# Patient Record
Sex: FEMALE | Race: WHITE | HISPANIC OR LATINO | URBAN - METROPOLITAN AREA
[De-identification: names, ages, dates, MRNs, and addresses within clinical notes are randomized per-mention and may not be internally consistent; named-entity substitution may affect disease eponyms.]

---

## 2022-10-12 ENCOUNTER — TELEPHONE (OUTPATIENT)
Dept: OBSTETRICS AND GYNECOLOGY | Facility: CLINIC | Age: 27
End: 2022-10-12

## 2022-10-12 NOTE — TELEPHONE ENCOUNTER
I added this pt to your schedule for Monday. She is 29 weeks with basically no [facility] care[facility] had only had an u/s in [place] and that's the only thing she has had done so far. Do you want me to add an U/S to this visit also?

## 2022-10-17 ENCOUNTER — INITIAL PRENATAL (OUTPATIENT)
Dept: OBSTETRICS AND GYNECOLOGY | Facility: CLINIC | Age: 27
End: 2022-10-17

## 2022-10-17 VITALS
HEIGHT: 65 IN | BODY MASS INDEX: 24.66 KG/M2 | WEIGHT: 148 LBS | SYSTOLIC BLOOD PRESSURE: 118 MMHG | DIASTOLIC BLOOD PRESSURE: 74 MMHG

## 2022-10-17 DIAGNOSIS — Z3A.29 29 WEEKS GESTATION OF PREGNANCY: Primary | ICD-10-CM

## 2022-10-17 DIAGNOSIS — Z78.9 USES SPANISH AS PRIMARY SPOKEN LANGUAGE: ICD-10-CM

## 2022-10-17 DIAGNOSIS — Z36.9 ENCOUNTER FOR ANTENATAL SCREENING, UNSPECIFIED: ICD-10-CM

## 2022-10-17 LAB
GLUCOSE UR STRIP-MCNC: NEGATIVE MG/DL
PROT UR STRIP-MCNC: NEGATIVE MG/DL

## 2022-10-17 PROCEDURE — 99204 OFFICE O/P NEW MOD 45 MIN: CPT | Performed by: STUDENT IN AN ORGANIZED HEALTH CARE EDUCATION/TRAINING PROGRAM

## 2022-10-17 PROCEDURE — 90686 IIV4 VACC NO PRSV 0.5 ML IM: CPT | Performed by: STUDENT IN AN ORGANIZED HEALTH CARE EDUCATION/TRAINING PROGRAM

## 2022-10-17 PROCEDURE — 90471 IMMUNIZATION ADMIN: CPT | Performed by: STUDENT IN AN ORGANIZED HEALTH CARE EDUCATION/TRAINING PROGRAM

## 2022-10-17 NOTE — PROGRESS NOTES
27 y.o.     29w0d  weeks presents for a Transfer of care OB appointment for a desired pregnancy.  She denies bleeding, cramping and abnormal vaginal discharge. This is a desired pregnancy    EDC:             by [  X ] LMP  [   ] US       G  2     P   0010           1. Chart review +CT ; denies in interview     2. SANCHO 29wga   OB labs today   NIPT  GC/CT today   Pap screen         3.  Kinyarwanda primary language             4.  34wga   -Not on progesterone            5.                  6.                 7.                   8.               9.                    10.                      Meds:  No current outpatient medications on file.           PN Labs Pending  [   ] Normal   [   ] Rh positive  [   ] Rh negative   [   ] RhoGam 28 wks             Ultrasounds:            2nd US:  date:     EGA:      [   ] CWD    Placenta:      Anatomy:              Postpartum contraception:                Infant feeding plan: Breastfeeding \Bottle                                 Feeling fine.   Periods are regular.        Active Ambulatory Problems     Diagnosis Date Noted   • Uses Kinyarwanda as primary spoken language 10/17/2022   • 29 weeks gestation of pregnancy 10/17/2022     Resolved Ambulatory Problems     Diagnosis Date Noted   • No Resolved Ambulatory Problems     No Additional Past Medical History                 No past surgical history on file.        POB hx:   PGYN hx:    Social hx:   Social History     Socioeconomic History   • Marital status:         [  X ] no h/o abuse/DV:         [ X  ] No ETOH, tobacco, drugs  [   ] Tobacco use   [   ] Alcohol use  [   ] Drug use   Marital status:           [  X ] FOB involved     Significant family hx: No family history on file.                 [ X  ] No birth defects, stillbirth           No Known Allergies              Meds: See above       ROS     PE NOB   Vitals: VSS; AF    General Appearance:  Awake. Alert. Well developed. Well nourished. In no acute  distress.      Lungs:  ° Clear to auscultation bilaterally without wheezes, rales or rhonchi.  Cardiovascular:  ° System: RRR, no murmur, consistent with normal pregnancy.  Abdomen:  Visual Inspection: ° Abdomen was normal on visual inspection.  Palpation: ° Abdomen was soft. ° Abdominal non-tender.  Genitalia:  External: ° Vulva was normal. ° Labia showed no abnormalities. ° Clitoris was normal. ° [place]'s gland was normal. ° Bartholin's gland was normal. ° Genitalia exhibited no lesion.  Vagina: ° Mucosa was normal. ° Not tender. ° No vaginal mass was observed. Vericositiy R sided   Cervix: ° No cervical discharge. ° Not tender.  Uterus: ° Fundal height was normal for gestational age. ° Not tender.  Uterine Adnexae: ° Normal without masses or tenderness.  Neurological:  ° Oriented to time, place, and person.  Skin:  ° General appearance was normal. No bruising or ecchymosis.  Obstetrical:  + FM and FCA         A/P     27 y.o.     29w0d  weeks presents for a OB appointment for a desired pregnancy.  She denies bleeding, cramping and abnormal vaginal discharge. Today     1. Chart review +CT ; denies in interview   SANCHO today     2. SANCHO 29wga   OB labs today   NIPT  GC/CT today   Pap screen         3.  Uzbek primary language             4.  34wga   -Not on progesterone @ 29wga        New OB exam completed, pap was collected   Prenatal labs were collected  Screening for CF, SMA and FX  and expanded carrier screening was discussed and pt was interested in having that lab done today.   Screening for cell free DNA was also discussed and the patient was interested. Blood was drawn today for this test. Unable to offer AFP   Patient is taking Prenatal vitamins  Problem list reviewed and updated  Reviewed routine prenatal care with the office to include but not limited to expected weight gain during pregnancy, Tylenol products are fine, avoid aspirin and ibuprofen; Zika (travel restrictions/ok to use insect  repellant); COVID precautions, not to change cat litter; food restrictions; avoidance of alcohol, tobacco, drugs and saunas/hot tubs.   We also discussed the structure of the practice and recommend that patients see all providers as we do not know who will do her delivery. The timing of appointments and the after hours call line use were also reviewed.   Flu vaccine today   Tdap next appt ( We were out today; pt desired)   All questions answered.   F/u in 2 week for OB appt         Tee Blum DO  10/17/2022    13:57 EDT

## 2022-10-18 LAB
ABO GROUP BLD: ABNORMAL
BASOPHILS # BLD AUTO: 0 X10E3/UL (ref 0–0.2)
BASOPHILS NFR BLD AUTO: 1 %
BLD GP AB SCN SERPL QL: NEGATIVE
EOSINOPHIL # BLD AUTO: 0.1 X10E3/UL (ref 0–0.4)
EOSINOPHIL NFR BLD AUTO: 1 %
ERYTHROCYTE [DISTWIDTH] IN BLOOD BY AUTOMATED COUNT: 12.3 % (ref 11.7–15.4)
HBA1C MFR BLD: 4.9 % (ref 4.8–5.6)
HBV SURFACE AG SERPL QL IA: NEGATIVE
HCT VFR BLD AUTO: 33.9 % (ref 34–46.6)
HCV AB S/CO SERPL IA: <0.1 S/CO RATIO (ref 0–0.9)
HGB A MFR BLD ELPH: 97.4 % (ref 96.4–98.8)
HGB A2 MFR BLD ELPH: 2.6 % (ref 1.8–3.2)
HGB BLD-MCNC: 11.5 G/DL (ref 11.1–15.9)
HGB F MFR BLD ELPH: 0 % (ref 0–2)
HGB FRACT BLD-IMP: NORMAL
HGB S MFR BLD ELPH: 0 %
HIV 1+2 AB+HIV1 P24 AG SERPL QL IA: NON REACTIVE
IMM GRANULOCYTES # BLD AUTO: 0 X10E3/UL (ref 0–0.1)
IMM GRANULOCYTES NFR BLD AUTO: 1 %
LYMPHOCYTES # BLD AUTO: 1.4 X10E3/UL (ref 0.7–3.1)
LYMPHOCYTES NFR BLD AUTO: 20 %
MCH RBC QN AUTO: 30.9 PG (ref 26.6–33)
MCHC RBC AUTO-ENTMCNC: 33.9 G/DL (ref 31.5–35.7)
MCV RBC AUTO: 91 FL (ref 79–97)
MONOCYTES # BLD AUTO: 0.6 X10E3/UL (ref 0.1–0.9)
MONOCYTES NFR BLD AUTO: 8 %
NEUTROPHILS # BLD AUTO: 4.9 X10E3/UL (ref 1.4–7)
NEUTROPHILS NFR BLD AUTO: 69 %
PLATELET # BLD AUTO: 209 X10E3/UL (ref 150–450)
RBC # BLD AUTO: 3.72 X10E6/UL (ref 3.77–5.28)
RH BLD: POSITIVE
RPR SER QL: NON REACTIVE
RUBV IGG SERPL IA-ACNC: 1.27 INDEX
VZV IGG SER IA-ACNC: 498 INDEX
WBC # BLD AUTO: 7.1 X10E3/UL (ref 3.4–10.8)

## 2022-10-19 LAB
AMPHETAMINES UR QL SCN: NEGATIVE NG/ML
BACTERIA UR CULT: NO GROWTH
BACTERIA UR CULT: NORMAL
BARBITURATES UR QL SCN: NEGATIVE NG/ML
BENZODIAZ UR QL SCN: NEGATIVE NG/ML
BZE UR QL SCN: NEGATIVE NG/ML
CANNABINOIDS UR QL SCN: NEGATIVE NG/ML
CREAT UR-MCNC: 27.2 MG/DL (ref 20–300)
LABORATORY COMMENT REPORT: NORMAL
METHADONE UR QL SCN: NEGATIVE NG/ML
OPIATES UR QL SCN: NEGATIVE NG/ML
OXYCODONE+OXYMORPHONE UR QL SCN: NEGATIVE NG/ML
PCP UR QL: NEGATIVE NG/ML
PH UR: 7.3 [PH] (ref 4.5–8.9)
PROPOXYPH UR QL SCN: NEGATIVE NG/ML

## 2022-10-24 LAB
CYSTIC FIBROSIS MUTATION 97: NORMAL
GENE DIS ANL CARRIER INTERP-IMP: NORMAL

## 2022-10-25 LAB
C TRACH RRNA CVX QL NAA+PROBE: NEGATIVE
CONV .: ABNORMAL
CYTOLOGIST CVX/VAG CYTO: ABNORMAL
CYTOLOGY CVX/VAG DOC CYTO: ABNORMAL
CYTOLOGY CVX/VAG DOC THIN PREP: ABNORMAL
DX ICD CODE: ABNORMAL
DX ICD CODE: ABNORMAL
HIV 1 & 2 AB SER-IMP: ABNORMAL
HPV I/H RISK 4 DNA CVX QL PROBE+SIG AMP: POSITIVE
N GONORRHOEA RRNA CVX QL NAA+PROBE: NEGATIVE
OTHER STN SPEC: ABNORMAL
PATHOLOGIST CVX/VAG CYTO: ABNORMAL
RECOM F/U CVX/VAG CYTO: ABNORMAL
STAT OF ADQ CVX/VAG CYTO-IMP: ABNORMAL
T VAGINALIS RRNA SPEC QL NAA+PROBE: NEGATIVE

## 2022-10-27 LAB
CLINICAL GENETICS COUNSELING NOTE: NORMAL
CLINICAL INFO: NORMAL
ETHNIC BACKGROUND STATED: NORMAL
FMR1 GENE CGG RPT BLD/T QL: NORMAL
LAB DIRECTOR NAME PROVIDER: NORMAL
LABORATORY COMMENT REPORT: NORMAL
LABORATORY COMMENT REPORT: NORMAL
REASON FOR REFERRAL (NARRATIVE): NORMAL
REF LAB TEST METHOD: NORMAL
SMN1 GENE MUT ANL BLD/T: NORMAL
SPECIMEN SOURCE: NORMAL
TEST PERFORMANCE INFO SPEC: NORMAL
TEST PERFORMANCE INFO SPEC: NORMAL

## 2022-11-01 ENCOUNTER — ROUTINE PRENATAL (OUTPATIENT)
Dept: OBSTETRICS AND GYNECOLOGY | Facility: CLINIC | Age: 27
End: 2022-11-01

## 2022-11-01 VITALS — BODY MASS INDEX: 23.96 KG/M2 | SYSTOLIC BLOOD PRESSURE: 102 MMHG | WEIGHT: 144 LBS | DIASTOLIC BLOOD PRESSURE: 62 MMHG

## 2022-11-01 DIAGNOSIS — O09.33 INSUFFICIENT PRENATAL CARE IN THIRD TRIMESTER: ICD-10-CM

## 2022-11-01 DIAGNOSIS — Z87.51 HISTORY OF PRETERM DELIVERY: ICD-10-CM

## 2022-11-01 DIAGNOSIS — Z34.93 PRENATAL CARE IN THIRD TRIMESTER: Primary | ICD-10-CM

## 2022-11-01 DIAGNOSIS — Z78.9 USES SPANISH AS PRIMARY SPOKEN LANGUAGE: ICD-10-CM

## 2022-11-01 PROBLEM — O98.819 CHLAMYDIA INFECTION DURING PREGNANCY: Status: ACTIVE | Noted: 2022-11-01

## 2022-11-01 PROBLEM — A74.9 CHLAMYDIA INFECTION DURING PREGNANCY: Status: ACTIVE | Noted: 2022-11-01

## 2022-11-01 LAB
GLUCOSE UR STRIP-MCNC: NEGATIVE MG/DL
PROT UR STRIP-MCNC: NEGATIVE MG/DL

## 2022-11-01 PROCEDURE — 99214 OFFICE O/P EST MOD 30 MIN: CPT | Performed by: NURSE PRACTITIONER

## 2022-11-01 RX ORDER — PRENATAL VIT/IRON FUM/FOLIC AC 27MG-0.8MG
TABLET ORAL DAILY
COMMUNITY

## 2022-11-01 RX ORDER — DOXYCYCLINE HYCLATE 50 MG/1
324 CAPSULE, GELATIN COATED ORAL
COMMUNITY
End: 2022-11-16

## 2022-11-01 NOTE — PROGRESS NOTES
OB follow up > 20 weeks    Chief Complaint   Patient presents with   • Routine Prenatal Visit       Beatris Butler is a 27 y.o.  31w1d being seen today for her obstetrical visit.  Patient reports no complaints. Taking prenatal vitamins: Yes. She has had scant prenatal care. Her EDC 23 was established with a physician in [place]. She is uncertain if she had an anatomy US. She thinks she is having a boy. States she had an US approx 2 months ago but is unable to recall location of the ultrasound.        Review of Systems  Genitourinary: Negative for contractions, cramping, vaginal bleeding, or SROM.   Fetal movement: normal  No Known Allergies     /62   Wt 65.3 kg (144 lb)   LMP 2022   BMI 23.96 kg/m²     FHT: 130s  BPM   Uterine Size: size equals dates       Assessment    1) pregnancy at 31w1d- Needs anatomy US.      2) H/O  delivery-  @ 34 weeks. No progesterone therapy.     3) S/P Flu vaccine    4) S/P covid vaccine and booster     5) Tdap vaccine- Disc that all pregnant women should get a Tdap shot in the third trimester, preferably between 27 weeks and 36 weeks of pregnancy. The Tdap shot is an effective and safe way to protect the baby from serious illness and complications of pertussis. Recommend that partners, family members, and infant caregivers should be up to date on theTdap vaccine if they have not previously been vaccinated. Ideally, all family members should be vaccinated at least 2 weeks before coming in contact with the . If not administered during pregnancy, the Tdap vaccine should be given immediately postpartum if the patient is not UTD on Tdap.  She is considering the vaccine.     6) Maltese speaking:  used for visit.     7) CF, SMA and FX neg. Desires T21 but lab was not drawn. Check NIPS.     8) Needs 2hr GTT     9) ASCUS/HPV+     10) + Chlamydia- SANCHO neg 10/22.     Plan    Continue prenatal vitamins  Reviewed this stage of  pregnancy  Problem list updated   RTO on Friday for US, 2hr GTT and [id number] then RTO 2 week     I spent 30 minutes caring for Beatris on this date of service. This time includes time spent by me in the following activities: preparing for the visit, reviewing tests, obtaining and/or reviewing a separately obtained history, performing a medically appropriate examination and/or evaluation, counseling and educating the patient/family/caregiver, ordering medications, tests, or procedures, documenting information in the medical record and care coordination      Ruth Grant, HENRIQUE  11/1/2022  11:50 EDT

## 2022-11-04 ENCOUNTER — TELEPHONE (OUTPATIENT)
Dept: OBSTETRICS AND GYNECOLOGY | Facility: CLINIC | Age: 27
End: 2022-11-04

## 2022-11-14 ENCOUNTER — TELEPHONE (OUTPATIENT)
Dept: OBSTETRICS AND GYNECOLOGY | Facility: CLINIC | Age: 27
End: 2022-11-14

## 2022-11-15 ENCOUNTER — ROUTINE PRENATAL (OUTPATIENT)
Dept: OBSTETRICS AND GYNECOLOGY | Facility: CLINIC | Age: 27
End: 2022-11-15

## 2022-11-15 VITALS — WEIGHT: 148 LBS | SYSTOLIC BLOOD PRESSURE: 102 MMHG | DIASTOLIC BLOOD PRESSURE: 62 MMHG | BODY MASS INDEX: 24.63 KG/M2

## 2022-11-15 DIAGNOSIS — Z78.9 USES SPANISH AS PRIMARY SPOKEN LANGUAGE: ICD-10-CM

## 2022-11-15 DIAGNOSIS — O98.819 CHLAMYDIA INFECTION DURING PREGNANCY: Primary | ICD-10-CM

## 2022-11-15 DIAGNOSIS — A74.9 CHLAMYDIA INFECTION DURING PREGNANCY: Primary | ICD-10-CM

## 2022-11-15 DIAGNOSIS — Z3A.33 33 WEEKS GESTATION OF PREGNANCY: ICD-10-CM

## 2022-11-15 DIAGNOSIS — Z87.51 HISTORY OF PRETERM DELIVERY: ICD-10-CM

## 2022-11-15 DIAGNOSIS — R87.810 ASCUS WITH POSITIVE HIGH RISK HPV CERVICAL: ICD-10-CM

## 2022-11-15 DIAGNOSIS — R87.610 ASCUS WITH POSITIVE HIGH RISK HPV CERVICAL: ICD-10-CM

## 2022-11-15 PROBLEM — Z3A.29 29 WEEKS GESTATION OF PREGNANCY: Status: RESOLVED | Noted: 2022-10-17 | Resolved: 2022-11-15

## 2022-11-15 LAB
GLUCOSE UR STRIP-MCNC: NEGATIVE MG/DL
PROT UR STRIP-MCNC: NEGATIVE MG/DL

## 2022-11-15 PROCEDURE — 90715 TDAP VACCINE 7 YRS/> IM: CPT | Performed by: STUDENT IN AN ORGANIZED HEALTH CARE EDUCATION/TRAINING PROGRAM

## 2022-11-15 PROCEDURE — 99214 OFFICE O/P EST MOD 30 MIN: CPT | Performed by: STUDENT IN AN ORGANIZED HEALTH CARE EDUCATION/TRAINING PROGRAM

## 2022-11-15 PROCEDURE — 90471 IMMUNIZATION ADMIN: CPT | Performed by: STUDENT IN AN ORGANIZED HEALTH CARE EDUCATION/TRAINING PROGRAM

## 2022-11-15 NOTE — PROGRESS NOTES
OB follow up > 20 weeks    Chief Complaint   Patient presents with   • Routine Prenatal Visit       Beatris Butler is a 27 y.o.  33+ being seen today for her obstetrical visit.  Patient reports no complaints. Taking prenatal vitamins: Desires TDap today       Review of Systems  Genitourinary: Negative for contractions, cramping, vaginal bleeding, or SROM.   Fetal movement: normal  No Known Allergies     /62   Wt 67.1 kg (148 lb)   LMP 2022   BMI 24.63 kg/m²   Vitals: VSS; AF    General Appearance:  Awake. Alert. Well developed. Well nourished. In no acute distress.    Visual Inspection: ° Abdomen was normal on visual inspection.  Palpation: ° Abdomen was soft. ° Abdominal non-tender.    Uterus: ° Fundal height was normal for gestational age. ° Not tender.  Uterine Adnexae: ° Normal without masses or tenderness.  Neurological:  ° Oriented to time, place, and person.  Skin:  ° General appearance was normal. No bruising or ecchymosis.  Obstetrical: + FCA and FM     Assessment    1) pregnancy at 33+  2hr WNL  Mild anemia    2) H/O  delivery-  @ 34 weeks. No progesterone therapy.     3) S/P Flu vaccine    4) S/P covid vaccine and booster     5) Tdap vaccine today    6) Tunisian speaking:  used for visit.     7) CF, SMA and FX neg. Desires T21 but lab was not drawn. Check NIPS today.     8)  ASCUS/HPV+   Colpo 6 weeks PP      9) + Chlamydia- SANCHO neg 10/22.       Plan    Continue prenatal vitamins  Reviewed this stage of pregnancy  Problem list updated   OB appt 36wga ; GBS and presentation US     I spent 30 minutes caring for Beatris on this date of service. This time includes time spent by me in the following activities: preparing for the visit, reviewing tests, obtaining and/or reviewing a separately obtained history, performing a medically appropriate examination and/or evaluation, counseling and educating the patient/family/caregiver, ordering medications, tests, or  procedures, documenting information in the medical record, independently interpreting results and communicating that information with the patient/family/caregiver and care coordination     Tee Blum DO  11/15/2022  09:32 EST

## 2022-11-16 RX ORDER — DOXYCYCLINE HYCLATE 50 MG/1
324 CAPSULE, GELATIN COATED ORAL 2 TIMES DAILY
Qty: 60 TABLET | Refills: 2 | Status: SHIPPED | OUTPATIENT
Start: 2022-11-16 | End: 2023-01-06 | Stop reason: HOSPADM

## 2022-11-17 PROBLEM — O09.33 INSUFFICIENT PRENATAL CARE IN THIRD TRIMESTER: Status: ACTIVE | Noted: 2022-11-17

## 2022-11-17 PROBLEM — Z34.93 PRENATAL CARE IN THIRD TRIMESTER: Status: ACTIVE | Noted: 2022-11-17

## 2022-12-05 ENCOUNTER — PATIENT OUTREACH (OUTPATIENT)
Dept: LABOR AND DELIVERY | Facility: HOSPITAL | Age: 27
End: 2022-12-05

## 2022-12-05 ENCOUNTER — REFERRAL TRIAGE (OUTPATIENT)
Dept: LABOR AND DELIVERY | Facility: HOSPITAL | Age: 27
End: 2022-12-05

## 2022-12-05 NOTE — OUTREACH NOTE
[facility]  Unable to Reach       Questions/Answers    Flowsheet Row Responses   Pending Outreach Confirm Patient Interest   Call Attempt First   Outcome Not available   Next Call Attempt Date 12/09/22              Maurice Yost, RN  Maternity Nurse Navigator    12/5/2022, 14:36 EST

## 2022-12-06 ENCOUNTER — ROUTINE PRENATAL (OUTPATIENT)
Dept: OBSTETRICS AND GYNECOLOGY | Facility: CLINIC | Age: 27
End: 2022-12-06

## 2022-12-06 VITALS — DIASTOLIC BLOOD PRESSURE: 72 MMHG | BODY MASS INDEX: 25.46 KG/M2 | SYSTOLIC BLOOD PRESSURE: 122 MMHG | WEIGHT: 153 LBS

## 2022-12-06 DIAGNOSIS — Z3A.36 36 WEEKS GESTATION OF PREGNANCY: ICD-10-CM

## 2022-12-06 DIAGNOSIS — Z36.85 ENCOUNTER FOR ANTENATAL SCREENING FOR STREPTOCOCCUS B: Primary | ICD-10-CM

## 2022-12-06 DIAGNOSIS — O09.33 INSUFFICIENT PRENATAL CARE IN THIRD TRIMESTER: ICD-10-CM

## 2022-12-06 DIAGNOSIS — Z87.51 HISTORY OF PRETERM DELIVERY: ICD-10-CM

## 2022-12-06 DIAGNOSIS — Z78.9 USES SPANISH AS PRIMARY SPOKEN LANGUAGE: ICD-10-CM

## 2022-12-06 PROBLEM — Z3A.33 33 WEEKS GESTATION OF PREGNANCY: Status: RESOLVED | Noted: 2022-11-15 | Resolved: 2022-12-06

## 2022-12-06 PROBLEM — Z34.93 PRENATAL CARE IN THIRD TRIMESTER: Status: RESOLVED | Noted: 2022-11-17 | Resolved: 2022-12-06

## 2022-12-06 PROBLEM — Z34.90 PREGNANCY: Status: ACTIVE | Noted: 2022-12-06

## 2022-12-06 LAB
GLUCOSE UR STRIP-MCNC: NEGATIVE MG/DL
PROT UR STRIP-MCNC: NEGATIVE MG/DL

## 2022-12-06 PROCEDURE — 99214 OFFICE O/P EST MOD 30 MIN: CPT | Performed by: OBSTETRICS & GYNECOLOGY

## 2022-12-06 NOTE — PROGRESS NOTES
CC: Pt here for ob office visit and ob internal/GBS.    HPI: Beatris Butler is a 27 y.o.  36w1d being seen today for her obstetrical visit.   Patient reports backache .   Fetal movement: normal. .        ROS: Pt denies visual changes, headaches, shortness of breath, chest pain, esophageal reflux, gastric pain,   nausea and vomiting, diarrhea, rashes, vaginal bleeding, edema, hip pain, pelvic pressure.     SMOKER? No    ALCOHOL? No  ILLICIT DRUGS? No    O:  /72   Wt 69.4 kg (153 lb)   LMP 2022   BMI 25.46 kg/m² , additional findings in addition to above flow sheet?: all recent labs reviewed with pt.     Data Review:  UA, flow sheet,  TESTING: u/s: no    Lab Results (last 24 hours)     Procedure Component Value Units Date/Time    POC Urinalysis Dipstick [[id number]] Resulted: 22    Specimen: Urine Updated: 22 0910     Glucose, UA Negative     Protein, POC Negative          I saw the patient with a face mask, gloves and eye protection  The patient herself was masked.  Social distancing was observed as appropriate. All COVID precautions observed.     A:    DIAGNOSES:  27 y.o.  36w1d  Patient Active Problem List   Diagnosis   • Uses Citizen of [place] as primary spoken language   • Chlamydia infection during pregnancy: SANCHO check 22    • History of  delivery @ 34 weeks in [place]    • ASCUS with positive high risk HPV cervical   • Insufficient prenatal care in third trimester   • Pregnancy     Diagnoses and all orders for this visit:    1. Encounter for  screening for Streptococcus B (Primary)  -     Strep B Screen - Swab, Vaginal/Rectum    2. History of  delivery @ 34 weeks in [place]     3. Insufficient prenatal care in third trimester    4. Uses Citizen of [place] as primary spoken language    5. 36 weeks gestation of pregnancy      Pregnancy Assessment : Active Problem with Pregnancy   NEW PROBLEMS? none    P:  Tests ordered for this or next visit: LABS:  ua, GBS  New Meds:No  Return in about 1 year (around 12/6/2023) for Annual physical.      Pt instructed to call for results of any testing done today and that failure to call if she has not heard from us could result in inadequate care and treament.  Pt verbalized her understanding.     I spent 30+ minutes caring for Beatris on this date of service. This time includes time spent by me in the following activities: preparing for the visit, reviewing tests, obtaining and/or reviewing a separately obtained history, performing a medically appropriate examination and/or evaluation, counseling and educating the patient/family/caregiver, ordering medications, tests, or procedures, referring and communicating with other health care professionals, documenting information in the medical record, independently interpreting results and communicating that information with the patient/family/caregiver and care coordination  Labor warnings reviewed with pt in Yoruba    Vinod Benitez MD

## 2022-12-08 LAB — GP B STREP DNA SPEC QL NAA+PROBE: NEGATIVE

## 2022-12-13 ENCOUNTER — ROUTINE PRENATAL (OUTPATIENT)
Dept: OBSTETRICS AND GYNECOLOGY | Facility: CLINIC | Age: 27
End: 2022-12-13

## 2022-12-13 ENCOUNTER — PATIENT OUTREACH (OUTPATIENT)
Dept: LABOR AND DELIVERY | Facility: HOSPITAL | Age: 27
End: 2022-12-13

## 2022-12-13 VITALS — WEIGHT: 151 LBS | SYSTOLIC BLOOD PRESSURE: 112 MMHG | BODY MASS INDEX: 25.13 KG/M2 | DIASTOLIC BLOOD PRESSURE: 74 MMHG

## 2022-12-13 DIAGNOSIS — Z87.51 HISTORY OF PRETERM DELIVERY: ICD-10-CM

## 2022-12-13 DIAGNOSIS — Z34.93 NORMAL PREGNANCY IN THIRD TRIMESTER: Primary | ICD-10-CM

## 2022-12-13 LAB
GLUCOSE UR STRIP-MCNC: NEGATIVE MG/DL
PROT UR STRIP-MCNC: NEGATIVE MG/DL

## 2022-12-13 PROCEDURE — 99213 OFFICE O/P EST LOW 20 MIN: CPT | Performed by: OBSTETRICS & GYNECOLOGY

## 2022-12-13 NOTE — OUTREACH NOTE
[facility]  Unable to Reach       Questions/Answers    Flowsheet Row Responses   Pending Outreach Confirm Patient Interest   Call Attempt Second   Outcome Not available   Next Call Attempt Date 12/27/22          Office appt for 12/20 1145. Have attempted x2 to call and VM not set up.    Maurice Yost, RN  Maternity Nurse Navigator    12/13/2022, 15:05 EST

## 2022-12-13 NOTE — PROGRESS NOTES
OB follow up     Beatris Butler is a 27 y.o.  37w1d being seen today for her obstetrical visit.  Patient reports no bleeding, no leaking and occasional contractions. Fetal movement: normal GBS was negative last visit.     Review of Systems  No bleeding, No cramping/contractions     /74   Wt 68.5 kg (151 lb)   LMP 2022   BMI 25.13 kg/m²     FHT: present BPM   Uterine Size: 37 cm   GBS discussed with patient    Assessment/Plan:    1) 27 y.o.  -pregnancy at 37w1d    2)   Encounter Diagnoses   Name Primary?   • Normal pregnancy in third trimester Yes   • History of  delivery @ 34 weeks in [place]     Test results reviewed with patient, labor warnings reviewed.   was used throughout this visit. I spent 20 minutes caring for Beatris on this date of service. This time includes time spent by me in the following activities: preparing for the visit, reviewing tests, performing a medically appropriate examination and/or evaluation, counseling and educating the patient/family/caregiver and documenting information in the medical record.      3) Reviewed this stage of pregnancy  4) Problem list updated     Return in about 1 week (around 2022) for OB INT.      Silverio Villalobos MD    2022  13:48 EST

## 2022-12-20 ENCOUNTER — ROUTINE PRENATAL (OUTPATIENT)
Dept: OBSTETRICS AND GYNECOLOGY | Facility: CLINIC | Age: 27
End: 2022-12-20

## 2022-12-20 VITALS — BODY MASS INDEX: 24.79 KG/M2 | WEIGHT: 149 LBS | SYSTOLIC BLOOD PRESSURE: 112 MMHG | DIASTOLIC BLOOD PRESSURE: 70 MMHG

## 2022-12-20 DIAGNOSIS — Z34.93 PRENATAL CARE IN THIRD TRIMESTER: Primary | ICD-10-CM

## 2022-12-20 DIAGNOSIS — O98.819 CHLAMYDIA INFECTION DURING PREGNANCY: ICD-10-CM

## 2022-12-20 DIAGNOSIS — A74.9 CHLAMYDIA INFECTION DURING PREGNANCY: ICD-10-CM

## 2022-12-20 LAB
GLUCOSE UR STRIP-MCNC: NEGATIVE MG/DL
PROT UR STRIP-MCNC: NEGATIVE MG/DL

## 2022-12-20 PROCEDURE — 99213 OFFICE O/P EST LOW 20 MIN: CPT | Performed by: NURSE PRACTITIONER

## 2022-12-20 NOTE — PROGRESS NOTES
OB follow up > 20 weeks    Chief Complaint   Patient presents with   • Routine Prenatal Visit       Beatris Butler is a 27 y.o.  38w1d being seen today for her obstetrical visit.  Patient reports irregular contractions. She reports good fetal movement. .         Review of Systems  Genitourinary: Negative for  vaginal bleeding, or SROM. + occas contractions   Fetal movement: normal  No Known Allergies     /70   Wt 67.6 kg (149 lb)   LMP 2022   BMI 24.79 kg/m²     FHT: 130s   BPM   Uterine Size: 38 cm     SVE:  Cervix posterior, exam very difficult, pt has a low set pubic bone.     Assessment    1) pregnancy at 38w1d- GBS negative.      2) H/O  delivery-  @ 34 weeks. No progesterone therapy.     3) S/P Flu vaccine    4) S/P covid vaccine and booster     5) S/P tDap vaccine     6) Angolan speaking:  used for visit.     7) CF, SMA and FX neg. NIPS neg.     8)  ASCUS/HPV+     9) + Chlamydia- SANCHO neg 10/22. Check G/C/T today.     Plan    Continue prenatal vitamins  Reviewed this stage of pregnancy  Problem list updated   RTO 1 week    I spent 20 minutes caring for Beatris on this date of service. This time includes time spent by me in the following activities: preparing for the visit, reviewing tests, obtaining and/or reviewing a separately obtained history, performing a medically appropriate examination and/or evaluation, counseling and educating the patient/family/caregiver, ordering medications, tests, or procedures, documenting information in the medical record and care coordination      Ruth Grant, HENRIQUE  2022  12:10 EST

## 2022-12-22 LAB
C TRACH RRNA SPEC QL NAA+PROBE: NEGATIVE
N GONORRHOEA RRNA SPEC QL NAA+PROBE: NEGATIVE
T VAGINALIS RRNA SPEC QL NAA+PROBE: NEGATIVE

## 2022-12-28 ENCOUNTER — ROUTINE PRENATAL (OUTPATIENT)
Dept: OBSTETRICS AND GYNECOLOGY | Facility: CLINIC | Age: 27
End: 2022-12-28
Payer: MEDICAID

## 2022-12-28 VITALS — DIASTOLIC BLOOD PRESSURE: 74 MMHG | BODY MASS INDEX: 25.53 KG/M2 | SYSTOLIC BLOOD PRESSURE: 102 MMHG | WEIGHT: 153.4 LBS

## 2022-12-28 DIAGNOSIS — Z3A.39 39 WEEKS GESTATION OF PREGNANCY: Primary | ICD-10-CM

## 2022-12-28 LAB
GLUCOSE UR STRIP-MCNC: NEGATIVE MG/DL
PROT UR STRIP-MCNC: NEGATIVE MG/DL

## 2022-12-28 PROCEDURE — 99213 OFFICE O/P EST LOW 20 MIN: CPT | Performed by: NURSE PRACTITIONER

## 2022-12-28 NOTE — PROGRESS NOTES
OB follow up > 20 weeks     Chief Complaint   Patient presents with   • Routine Prenatal Visit       Beatris Butler is a 27 y.o.  39w2d being seen today for her obstetrical visit.  She reports good fetal movement. Patient reports irregular contractions. Described as mild.       Review of Systems  Genitourinary: Negative for  vaginal bleeding, or SROM. + occas contractions   Fetal movement: normal  No Known Allergies     /74   Wt 69.6 kg (153 lb 6.4 oz)   LMP 2022   BMI 25.53 kg/m²     FHT: 130s  BPM   Uterine Size: 38 cm     SVE:  /-3.     Assessment    1) pregnancy at 39wd- GBS negative.      2) H/O  delivery-  @ 34 weeks. No progesterone therapy.     3) S/P Flu vaccine    4) S/P covid vaccine and booster     5) S/P tDap vaccine     6) Chinese speaking:  used for visit.     7) CF, SMA and FX neg. NIPS neg.     8)  ASCUS/HPV+     9) + Chlamydia- SANCHO neg 10/22 and neg      Plan    Continue prenatal vitamins  Reviewed this stage of pregnancy  Problem list updated   RTO 1 week    I spent 20 minutes caring for Beatris on this date of service. This time includes time spent by me in the following activities: preparing for the visit, reviewing tests, obtaining and/or reviewing a separately obtained history, performing a medically appropriate examination and/or evaluation, counseling and educating the patient/family/caregiver, ordering medications, tests, or procedures, documenting information in the medical record and care coordination      HENRIQUE Kan  2022  11:58 EST

## 2023-01-04 ENCOUNTER — ANESTHESIA EVENT (OUTPATIENT)
Dept: OBSTETRICS AND GYNECOLOGY | Facility: HOSPITAL | Age: 28
End: 2023-01-04
Payer: MEDICAID

## 2023-01-04 ENCOUNTER — ROUTINE PRENATAL (OUTPATIENT)
Dept: OBSTETRICS AND GYNECOLOGY | Facility: CLINIC | Age: 28
End: 2023-01-04
Payer: MEDICAID

## 2023-01-04 ENCOUNTER — HOSPITAL ENCOUNTER (INPATIENT)
Facility: HOSPITAL | Age: 28
LOS: 2 days | Discharge: HOME | End: 2023-01-06
Attending: OBSTETRICS & GYNECOLOGY | Admitting: OBSTETRICS & GYNECOLOGY
Payer: MEDICAID

## 2023-01-04 ENCOUNTER — ANESTHESIA (OUTPATIENT)
Dept: OBSTETRICS AND GYNECOLOGY | Facility: HOSPITAL | Age: 28
End: 2023-01-04
Payer: MEDICAID

## 2023-01-04 ENCOUNTER — ANCILLARY ORDERS (OUTPATIENT)
Dept: OBSTETRICS AND GYNECOLOGY | Facility: CLINIC | Age: 28
End: 2023-01-04
Payer: MEDICAID

## 2023-01-04 VITALS — DIASTOLIC BLOOD PRESSURE: 72 MMHG | BODY MASS INDEX: 25.56 KG/M2 | WEIGHT: 153.6 LBS | SYSTOLIC BLOOD PRESSURE: 110 MMHG

## 2023-01-04 DIAGNOSIS — O41.03X0 OLIGOHYDRAMNIOS IN THIRD TRIMESTER, SINGLE OR UNSPECIFIED FETUS: ICD-10-CM

## 2023-01-04 DIAGNOSIS — Z98.891 S/P CESAREAN SECTION: Primary | ICD-10-CM

## 2023-01-04 DIAGNOSIS — O48.0 POST-DATES PREGNANCY: ICD-10-CM

## 2023-01-04 DIAGNOSIS — R93.89 ABNORMAL ULTRASOUND: ICD-10-CM

## 2023-01-04 DIAGNOSIS — O45.93 PLACENTAL ABRUPTION IN THIRD TRIMESTER: ICD-10-CM

## 2023-01-04 DIAGNOSIS — O36.8190 DECREASED FETAL MOVEMENT AFFECTING MANAGEMENT OF MOTHER, ANTEPARTUM: ICD-10-CM

## 2023-01-04 DIAGNOSIS — Z34.93 PRENATAL CARE IN THIRD TRIMESTER: Primary | ICD-10-CM

## 2023-01-04 LAB
ABO GROUP BLD: NORMAL
ABO GROUP BLD: NORMAL
AMPHET+METHAMPHET UR QL: NEGATIVE
AMPHETAMINES UR QL: NEGATIVE
BACTERIA UR QL AUTO: ABNORMAL /HPF
BARBITURATES UR QL SCN: NEGATIVE
BENZODIAZ UR QL SCN: NEGATIVE
BILIRUB UR QL STRIP: NEGATIVE
BLD GP AB SCN SERPL QL: NEGATIVE
BUPRENORPHINE SERPL-MCNC: NEGATIVE NG/ML
CANNABINOIDS SERPL QL: NEGATIVE
CLARITY UR: CLEAR
COCAINE UR QL: NEGATIVE
COLOR UR: YELLOW
DEPRECATED RDW RBC AUTO: 43 FL (ref 37–54)
ERYTHROCYTE [DISTWIDTH] IN BLOOD BY AUTOMATED COUNT: 13 % (ref 12.3–15.4)
GLUCOSE UR STRIP-MCNC: NEGATIVE MG/DL
GLUCOSE UR STRIP-MCNC: NEGATIVE MG/DL
HCT VFR BLD AUTO: 39.1 % (ref 34–46.6)
HGB BLD-MCNC: 12.8 G/DL (ref 12–15.9)
HGB UR QL STRIP.AUTO: ABNORMAL
HYALINE CASTS UR QL AUTO: ABNORMAL /LPF
KETONES UR QL STRIP: NEGATIVE
LEUKOCYTE ESTERASE UR QL STRIP.AUTO: NEGATIVE
MCH RBC QN AUTO: 29.4 PG (ref 26.6–33)
MCHC RBC AUTO-ENTMCNC: 32.7 G/DL (ref 31.5–35.7)
MCV RBC AUTO: 89.7 FL (ref 79–97)
METHADONE UR QL SCN: NEGATIVE
NITRITE UR QL STRIP: NEGATIVE
OPIATES UR QL: NEGATIVE
OXYCODONE UR QL SCN: NEGATIVE
PCP UR QL SCN: NEGATIVE
PH UR STRIP.AUTO: 7 [PH] (ref 4.5–8)
PLATELET # BLD AUTO: 190 10*3/MM3 (ref 140–450)
PMV BLD AUTO: 11.6 FL (ref 6–12)
PROPOXYPH UR QL: NEGATIVE
PROT UR QL STRIP: NEGATIVE
PROT UR STRIP-MCNC: NEGATIVE MG/DL
RBC # BLD AUTO: 4.36 10*6/MM3 (ref 3.77–5.28)
RBC # UR STRIP: ABNORMAL /HPF
REF LAB TEST METHOD: ABNORMAL
RH BLD: POSITIVE
RH BLD: POSITIVE
SP GR UR STRIP: 1.01 (ref 1–1.03)
SQUAMOUS #/AREA URNS HPF: ABNORMAL /HPF
T&S EXPIRATION DATE: NORMAL
TRICYCLICS UR QL SCN: NEGATIVE
UROBILINOGEN UR QL STRIP: ABNORMAL
WBC # UR STRIP: ABNORMAL /HPF
WBC NRBC COR # BLD: 6.98 10*3/MM3 (ref 3.4–10.8)

## 2023-01-04 PROCEDURE — 94799 UNLISTED PULMONARY SVC/PX: CPT

## 2023-01-04 PROCEDURE — 0 CEFAZOLIN SODIUM-DEXTROSE 2-3 GM-%(50ML) RECONSTITUTED SOLUTION: Performed by: NURSE ANESTHETIST, CERTIFIED REGISTERED

## 2023-01-04 PROCEDURE — 86900 BLOOD TYPING SEROLOGIC ABO: CPT

## 2023-01-04 PROCEDURE — 88307 TISSUE EXAM BY PATHOLOGIST: CPT

## 2023-01-04 PROCEDURE — 85027 COMPLETE CBC AUTOMATED: CPT | Performed by: OBSTETRICS & GYNECOLOGY

## 2023-01-04 PROCEDURE — 86900 BLOOD TYPING SEROLOGIC ABO: CPT | Performed by: OBSTETRICS & GYNECOLOGY

## 2023-01-04 PROCEDURE — 86901 BLOOD TYPING SEROLOGIC RH(D): CPT

## 2023-01-04 PROCEDURE — 99214 OFFICE O/P EST MOD 30 MIN: CPT | Performed by: NURSE PRACTITIONER

## 2023-01-04 PROCEDURE — 80306 DRUG TEST PRSMV INSTRMNT: CPT | Performed by: OBSTETRICS & GYNECOLOGY

## 2023-01-04 PROCEDURE — 94761 N-INVAS EAR/PLS OXIMETRY MLT: CPT

## 2023-01-04 PROCEDURE — C1755 CATHETER, INTRASPINAL: HCPCS | Performed by: NURSE ANESTHETIST, CERTIFIED REGISTERED

## 2023-01-04 PROCEDURE — 59515 CESAREAN DELIVERY: CPT | Performed by: OBSTETRICS & GYNECOLOGY

## 2023-01-04 PROCEDURE — 3E033VJ INTRODUCTION OF OTHER HORMONE INTO PERIPHERAL VEIN, PERCUTANEOUS APPROACH: ICD-10-PCS

## 2023-01-04 PROCEDURE — 86850 RBC ANTIBODY SCREEN: CPT | Performed by: OBSTETRICS & GYNECOLOGY

## 2023-01-04 PROCEDURE — 81001 URINALYSIS AUTO W/SCOPE: CPT | Performed by: OBSTETRICS & GYNECOLOGY

## 2023-01-04 PROCEDURE — 25010000002 KETOROLAC TROMETHAMINE PER 15 MG: Performed by: OBSTETRICS & GYNECOLOGY

## 2023-01-04 PROCEDURE — 86901 BLOOD TYPING SEROLOGIC RH(D): CPT | Performed by: OBSTETRICS & GYNECOLOGY

## 2023-01-04 RX ORDER — LIDOCAINE HYDROCHLORIDE 20 MG/ML
INJECTION, SOLUTION INFILTRATION; PERINEURAL AS NEEDED
Status: DISCONTINUED | OUTPATIENT
Start: 2023-01-04 | End: 2023-01-04 | Stop reason: SURG

## 2023-01-04 RX ORDER — PRENATAL VIT/IRON FUM/FOLIC AC 27MG-0.8MG
1 TABLET ORAL DAILY
Status: DISCONTINUED | OUTPATIENT
Start: 2023-01-04 | End: 2023-01-06 | Stop reason: HOSPADM

## 2023-01-04 RX ORDER — CEFAZOLIN SODIUM 2 G/50ML
SOLUTION INTRAVENOUS AS NEEDED
Status: DISCONTINUED | OUTPATIENT
Start: 2023-01-04 | End: 2023-01-04 | Stop reason: SURG

## 2023-01-04 RX ORDER — OXYCODONE HYDROCHLORIDE 5 MG/1
5 TABLET ORAL EVERY 4 HOURS PRN
Status: DISCONTINUED | OUTPATIENT
Start: 2023-01-04 | End: 2023-01-06 | Stop reason: HOSPADM

## 2023-01-04 RX ORDER — LIDOCAINE HYDROCHLORIDE 10 MG/ML
5 INJECTION, SOLUTION EPIDURAL; INFILTRATION; INTRACAUDAL; PERINEURAL AS NEEDED
Status: DISCONTINUED | OUTPATIENT
Start: 2023-01-04 | End: 2023-01-05

## 2023-01-04 RX ORDER — OXYTOCIN/0.9 % SODIUM CHLORIDE 30/500 ML
125 PLASTIC BAG, INJECTION (ML) INTRAVENOUS CONTINUOUS PRN
Status: DISCONTINUED | OUTPATIENT
Start: 2023-01-04 | End: 2023-01-06 | Stop reason: HOSPADM

## 2023-01-04 RX ORDER — BUPIVACAINE HYDROCHLORIDE 5 MG/ML
INJECTION, SOLUTION EPIDURAL; INTRACAUDAL AS NEEDED
Status: DISCONTINUED | OUTPATIENT
Start: 2023-01-04 | End: 2023-01-04 | Stop reason: SURG

## 2023-01-04 RX ORDER — OXYTOCIN/0.9 % SODIUM CHLORIDE 30/500 ML
250 PLASTIC BAG, INJECTION (ML) INTRAVENOUS CONTINUOUS
Status: ACTIVE | OUTPATIENT
Start: 2023-01-04 | End: 2023-01-04

## 2023-01-04 RX ORDER — LIDOCAINE HYDROCHLORIDE AND EPINEPHRINE 15; 5 MG/ML; UG/ML
INJECTION, SOLUTION EPIDURAL AS NEEDED
Status: DISCONTINUED | OUTPATIENT
Start: 2023-01-04 | End: 2023-01-04 | Stop reason: SURG

## 2023-01-04 RX ORDER — EPHEDRINE SULFATE 50 MG/ML
10 INJECTION, SOLUTION INTRAVENOUS
Status: DISCONTINUED | OUTPATIENT
Start: 2023-01-04 | End: 2023-01-05

## 2023-01-04 RX ORDER — SODIUM CHLORIDE 0.9 % (FLUSH) 0.9 %
10 SYRINGE (ML) INJECTION EVERY 12 HOURS SCHEDULED
Status: DISCONTINUED | OUTPATIENT
Start: 2023-01-04 | End: 2023-01-05

## 2023-01-04 RX ORDER — ACETAMINOPHEN 325 MG/1
650 TABLET ORAL EVERY 6 HOURS
Status: DISCONTINUED | OUTPATIENT
Start: 2023-01-06 | End: 2023-01-06 | Stop reason: HOSPADM

## 2023-01-04 RX ORDER — ACETAMINOPHEN 500 MG
1000 TABLET ORAL EVERY 6 HOURS
Status: COMPLETED | OUTPATIENT
Start: 2023-01-04 | End: 2023-01-05

## 2023-01-04 RX ORDER — METHYLERGONOVINE MALEATE 0.2 MG/ML
200 INJECTION INTRAVENOUS ONCE AS NEEDED
Status: DISCONTINUED | OUTPATIENT
Start: 2023-01-04 | End: 2023-01-06 | Stop reason: HOSPADM

## 2023-01-04 RX ORDER — FENTANYL 0.2 MG/100ML-BUPIV 0.125%-NACL 0.9% EPIDURAL INJ 2/0.125 MCG/ML-%
SOLUTION INJECTION
Status: COMPLETED
Start: 2023-01-04 | End: 2023-01-04

## 2023-01-04 RX ORDER — SIMETHICONE 80 MG
80 TABLET,CHEWABLE ORAL 4 TIMES DAILY PRN
Status: DISCONTINUED | OUTPATIENT
Start: 2023-01-04 | End: 2023-01-06 | Stop reason: HOSPADM

## 2023-01-04 RX ORDER — MISOPROSTOL 200 UG/1
800 TABLET ORAL ONCE AS NEEDED
Status: DISCONTINUED | OUTPATIENT
Start: 2023-01-04 | End: 2023-01-06 | Stop reason: HOSPADM

## 2023-01-04 RX ORDER — DIPHENHYDRAMINE HYDROCHLORIDE 50 MG/ML
25 INJECTION INTRAMUSCULAR; INTRAVENOUS EVERY 4 HOURS PRN
Status: DISCONTINUED | OUTPATIENT
Start: 2023-01-04 | End: 2023-01-06 | Stop reason: HOSPADM

## 2023-01-04 RX ORDER — FERROUS GLUCONATE 324(37.5)
324 TABLET ORAL 2 TIMES DAILY
Status: DISCONTINUED | OUTPATIENT
Start: 2023-01-04 | End: 2023-01-06 | Stop reason: CLARIF

## 2023-01-04 RX ORDER — ONDANSETRON 4 MG/1
4 TABLET, FILM COATED ORAL EVERY 6 HOURS PRN
Status: DISCONTINUED | OUTPATIENT
Start: 2023-01-04 | End: 2023-01-06 | Stop reason: HOSPADM

## 2023-01-04 RX ORDER — OXYTOCIN/0.9 % SODIUM CHLORIDE 30/500 ML
2-20 PLASTIC BAG, INJECTION (ML) INTRAVENOUS
Status: DISCONTINUED | OUTPATIENT
Start: 2023-01-04 | End: 2023-01-04

## 2023-01-04 RX ORDER — SODIUM CHLORIDE 0.9 % (FLUSH) 0.9 %
10 SYRINGE (ML) INJECTION AS NEEDED
Status: DISCONTINUED | OUTPATIENT
Start: 2023-01-04 | End: 2023-01-05

## 2023-01-04 RX ORDER — FENTANYL 0.2 MG/100ML-BUPIV 0.125%-NACL 0.9% EPIDURAL INJ 2/0.125 MCG/ML-%
SOLUTION INJECTION CONTINUOUS
Status: DISCONTINUED | OUTPATIENT
Start: 2023-01-04 | End: 2023-01-05

## 2023-01-04 RX ORDER — ONDANSETRON 2 MG/ML
4 INJECTION INTRAMUSCULAR; INTRAVENOUS EVERY 6 HOURS PRN
Status: DISCONTINUED | OUTPATIENT
Start: 2023-01-04 | End: 2023-01-06 | Stop reason: HOSPADM

## 2023-01-04 RX ORDER — DOCUSATE SODIUM 100 MG/1
100 CAPSULE, LIQUID FILLED ORAL 2 TIMES DAILY PRN
Status: DISCONTINUED | OUTPATIENT
Start: 2023-01-04 | End: 2023-01-06 | Stop reason: HOSPADM

## 2023-01-04 RX ORDER — DIPHENHYDRAMINE HCL 25 MG
25 CAPSULE ORAL EVERY 4 HOURS PRN
Status: DISCONTINUED | OUTPATIENT
Start: 2023-01-04 | End: 2023-01-06 | Stop reason: HOSPADM

## 2023-01-04 RX ORDER — OXYTOCIN/0.9 % SODIUM CHLORIDE 30/500 ML
2-20 PLASTIC BAG, INJECTION (ML) INTRAVENOUS
Status: DISCONTINUED | OUTPATIENT
Start: 2023-01-04 | End: 2023-01-05

## 2023-01-04 RX ORDER — OXYTOCIN/0.9 % SODIUM CHLORIDE 30/500 ML
999 PLASTIC BAG, INJECTION (ML) INTRAVENOUS ONCE
Status: DISCONTINUED | OUTPATIENT
Start: 2023-01-04 | End: 2023-01-06 | Stop reason: HOSPADM

## 2023-01-04 RX ORDER — IBUPROFEN 600 MG/1
600 TABLET ORAL EVERY 6 HOURS
Status: DISCONTINUED | OUTPATIENT
Start: 2023-01-05 | End: 2023-01-06 | Stop reason: HOSPADM

## 2023-01-04 RX ORDER — SODIUM CHLORIDE, SODIUM LACTATE, POTASSIUM CHLORIDE, CALCIUM CHLORIDE 600; 310; 30; 20 MG/100ML; MG/100ML; MG/100ML; MG/100ML
125 INJECTION, SOLUTION INTRAVENOUS CONTINUOUS
Status: DISCONTINUED | OUTPATIENT
Start: 2023-01-04 | End: 2023-01-06 | Stop reason: HOSPADM

## 2023-01-04 RX ORDER — CEFAZOLIN SODIUM 2 G/50ML
2 SOLUTION INTRAVENOUS EVERY 8 HOURS
Status: DISCONTINUED | OUTPATIENT
Start: 2023-01-04 | End: 2023-01-05

## 2023-01-04 RX ORDER — CEFAZOLIN SODIUM 2 G/50ML
SOLUTION INTRAVENOUS
Status: COMPLETED
Start: 2023-01-04 | End: 2023-01-04

## 2023-01-04 RX ORDER — CARBOPROST TROMETHAMINE 250 UG/ML
250 INJECTION, SOLUTION INTRAMUSCULAR
Status: DISCONTINUED | OUTPATIENT
Start: 2023-01-04 | End: 2023-01-06 | Stop reason: HOSPADM

## 2023-01-04 RX ORDER — KETOROLAC TROMETHAMINE 30 MG/ML
15 INJECTION, SOLUTION INTRAMUSCULAR; INTRAVENOUS EVERY 6 HOURS
Status: COMPLETED | OUTPATIENT
Start: 2023-01-04 | End: 2023-01-05

## 2023-01-04 RX ORDER — ONDANSETRON 2 MG/ML
4 INJECTION INTRAMUSCULAR; INTRAVENOUS ONCE AS NEEDED
Status: ACTIVE | OUTPATIENT
Start: 2023-01-04 | End: 2023-01-05

## 2023-01-04 RX ORDER — AZITHROMYCIN 500 MG/1
INJECTION, POWDER, LYOPHILIZED, FOR SOLUTION INTRAVENOUS
Status: DISPENSED
Start: 2023-01-04 | End: 2023-01-05

## 2023-01-04 RX ORDER — OXYCODONE HYDROCHLORIDE 5 MG/1
10 TABLET ORAL EVERY 4 HOURS PRN
Status: DISCONTINUED | OUTPATIENT
Start: 2023-01-04 | End: 2023-01-06 | Stop reason: HOSPADM

## 2023-01-04 RX ADMIN — OXYCODONE HYDROCHLORIDE 5 MG: 5 TABLET ORAL at 21:11

## 2023-01-04 RX ADMIN — DOCUSATE SODIUM 100 MG: 100 CAPSULE ORAL at 21:12

## 2023-01-04 RX ADMIN — SODIUM CHLORIDE, POTASSIUM CHLORIDE, SODIUM LACTATE AND CALCIUM CHLORIDE 125 ML/HR: 600; 310; 30; 20 INJECTION, SOLUTION INTRAVENOUS at 21:51

## 2023-01-04 RX ADMIN — LIDOCAINE HYDROCHLORIDE 4 ML: 20 INJECTION, SOLUTION INFILTRATION; PERINEURAL at 17:43

## 2023-01-04 RX ADMIN — BUPIVACAINE HYDROCHLORIDE 4 ML: 5 INJECTION, SOLUTION EPIDURAL; INTRACAUDAL; PERINEURAL at 17:52

## 2023-01-04 RX ADMIN — KETOROLAC TROMETHAMINE 15 MG: 30 INJECTION, SOLUTION INTRAMUSCULAR; INTRAVENOUS at 19:25

## 2023-01-04 RX ADMIN — Medication 10 ML/HR: at 14:28

## 2023-01-04 RX ADMIN — LIDOCAINE HYDROCHLORIDE AND EPINEPHRINE 3 ML: 15; 5 INJECTION, SOLUTION EPIDURAL at 14:21

## 2023-01-04 RX ADMIN — LIDOCAINE HYDROCHLORIDE 4 ML: 20 INJECTION, SOLUTION INFILTRATION; PERINEURAL at 17:52

## 2023-01-04 RX ADMIN — CEFAZOLIN SODIUM 2 G: 2 SOLUTION INTRAVENOUS at 17:52

## 2023-01-04 RX ADMIN — SODIUM CHLORIDE, POTASSIUM CHLORIDE, SODIUM LACTATE AND CALCIUM CHLORIDE 125 ML/HR: 600; 310; 30; 20 INJECTION, SOLUTION INTRAVENOUS at 14:23

## 2023-01-04 RX ADMIN — ACETAMINOPHEN 1000 MG: 500 TABLET, FILM COATED ORAL at 22:52

## 2023-01-04 RX ADMIN — SODIUM CHLORIDE, POTASSIUM CHLORIDE, SODIUM LACTATE AND CALCIUM CHLORIDE 125 ML/HR: 600; 310; 30; 20 INJECTION, SOLUTION INTRAVENOUS at 12:40

## 2023-01-04 RX ADMIN — OXYTOCIN-SODIUM CHLORIDE 0.9% IV SOLN 30 UNIT/500ML 2 MILLI-UNITS/MIN: 30-0.9/5 SOLUTION at 12:55

## 2023-01-04 RX ADMIN — BUPIVACAINE HYDROCHLORIDE 4 ML: 5 INJECTION, SOLUTION EPIDURAL; INTRACAUDAL; PERINEURAL at 17:43

## 2023-01-04 RX ADMIN — SODIUM CHLORIDE, POTASSIUM CHLORIDE, SODIUM LACTATE AND CALCIUM CHLORIDE 1000 ML: 600; 310; 30; 20 INJECTION, SOLUTION INTRAVENOUS at 13:41

## 2023-01-04 RX ADMIN — SODIUM CHLORIDE, POTASSIUM CHLORIDE, SODIUM LACTATE AND CALCIUM CHLORIDE 125 ML/HR: 600; 310; 30; 20 INJECTION, SOLUTION INTRAVENOUS at 16:25

## 2023-01-04 RX ADMIN — LIDOCAINE HYDROCHLORIDE AND EPINEPHRINE 3 ML: 15; 5 INJECTION, SOLUTION EPIDURAL at 14:05

## 2023-01-04 NOTE — ANESTHESIA PROCEDURE NOTES
Labor Epidural    Pre-sedation assessment completed: 1/4/2023 1:46 PM    Patient reassessed immediately prior to procedure    Patient location during procedure: OB  Start Time: 1/4/2023 1:50 PM  Stop Time: 1/4/2023 2:22 PM  Performed By  CRNA/CAA: Jian Yang CRNA  Preanesthetic Checklist  Completed: patient identified, IV checked, site marked, risks and benefits discussed, surgical consent, monitors and equipment checked, pre-op evaluation and timeout performed  Prep:  Pt Position:sitting  Sterile Tech:cap, gloves, mask and sterile barrier  Prep:chlorhexidine gluconate and isopropyl alcohol  Monitoring:blood pressure monitoring, EKG and continuous pulse oximetry  Epidural Block Procedure:  Approach:midline  Guidance:landmark technique and palpation technique  Location:L4-L5  Needle Type:Tuohy  Needle Gauge:17 G  Loss of Resistance Medium: saline  Loss of Resistance: 8cm  Cath Depth at skin:13 cm  Paresthesia: none  Aspiration:negative  Test Dose:negative  Number of Attempts: 2 (attempt 1, cath placed. tachycardia with test dose, and heme aspirated. cath replaced)  Post Assessment:  Dressing:occlusive dressing applied and secured with tape  Pt Tolerance:patient tolerated the procedure well with no apparent complications  Complications:no

## 2023-01-04 NOTE — H&P
27-year-old -1-0-1 presents from the office at 40 weeks and 2 days for induction of labor.  Patient had a BPP in the office today where her HAMLET was found to be 5 cm and BPP was 0 out of 8.  Patient was brought over from the office for an induction of labor.  GBBS is noted to be negative.  Patient's prenatal care has been complicated by history of a 34-week delivery in [place] approximately 7 years ago.  Patient has had chlamydia in this pregnancy and she was treated with a negative test of cure.  Patient's admission hemoglobin is 12.8.  NST is reactive.  Patient is irregularly radha and baby is tolerating those contractions.  She is 2 cm dilated/60% effaced/-2 station.  We will proceed with Pitocin per protocol.  Plan reviewed with patient via .  All questions answered.  Anticipate vaginal delivery.

## 2023-01-04 NOTE — H&P
"    Patient Care Team:  System, Provider Not In as PCP - General    Chief complaint BPP 0/8. Late decelerations, VB       HPI: 26 Yo  with an IUP @ 40.2 weeks here for IOL for BPP 0/8 in the office and IUGR with growth in the 9 %. She was induced with pitocin.  She had some deep variable decels and so an FSE was placed and pitocin stopped. she made it to 5 cm and started to have late decels once pitocin was restarted. Dr Benitez and I checked the patient and she was still 5 cm and had significant bright red vaginal bleeding running out of the vagina, concerning for an acute abruption. rec 1 LTCS and pt is agreeable.       PMH: History reviewed. No pertinent past medical history.      PSH: History reviewed. No pertinent surgical history.    SoHx:   Social History     Socioeconomic History   • Marital status: Significant Other   Tobacco Use   • Smoking status: Never   • Smokeless tobacco: Never   Vaping Use   • Vaping Use: Never used   Substance and Sexual Activity   • Alcohol use: Never   • Drug use: Never       FHx: No family history on file.    PGyn Hx: deferred    POBHx:  1  34 weeks  present    Allergies: Patient has no known allergies.    Medications:   No current facility-administered medications on file prior to encounter.     Current Outpatient Medications on File Prior to Encounter   Medication Sig Dispense Refill   • Calcium Carbonate-Vit D-Min (CALCIUM 1200 PO) Take  by mouth.     • ferrous gluconate (FERGON) 324 MG tablet Take 1 tablet by mouth 2 (Two) Times a Day. 60 tablet 2   • Prenatal Vit-Fe Fumarate-FA (prenatal vitamin 27-0.8) 27-0.8 MG tablet tablet Take  by mouth Daily.         /67   Pulse 62   Temp 98.1 °F (36.7 °C) (Oral)   Resp 18   Ht 154 cm (60.63\")   Wt 69.9 kg (154 lb)   LMP 2022   SpO2 100%   Breastfeeding No   BMI 29.45 kg/m²     Review of Systems   Constitutional: Positive for activity change. Negative for appetite change, fatigue, fever and unexpected " weight change.   Eyes: Negative for photophobia and visual disturbance.   Respiratory: Negative for cough and shortness of breath.    Cardiovascular: Negative for chest pain and palpitations.   Gastrointestinal: Negative for abdominal distention, abdominal pain, constipation, diarrhea and nausea.   Endocrine: Negative for cold intolerance and heat intolerance.   Genitourinary: Positive for vaginal bleeding. Negative for dyspareunia, dysuria, menstrual problem, pelvic pain and vaginal discharge.   Musculoskeletal: Negative for back pain.   Skin: Negative for color change and rash.   Neurological: Negative for headaches.   Hematological: Negative for adenopathy. Does not bruise/bleed easily.   Psychiatric/Behavioral: Negative for dysphoric mood. The patient is nervous/anxious.        Physical Exam  Vitals and nursing note reviewed.   Constitutional:       Appearance: She is well-developed.   HENT:      Head: Normocephalic and atraumatic.   Eyes:      General: No scleral icterus.     Conjunctiva/sclera: Conjunctivae normal.   Neck:      Thyroid: No thyromegaly.   Abdominal:      General: There is no distension.      Palpations: There is no mass.      Tenderness: There is no abdominal tenderness. There is no guarding or rebound.      Hernia: No hernia is present.      Comments: No FT   Musculoskeletal:      Cervical back: Neck supple.   Skin:     General: Skin is warm and dry.   Neurological:      Mental Status: She is alert and oriented to person, place, and time.   Psychiatric:         Mood and Affect: Mood normal.         Behavior: Behavior normal.         Thought Content: Thought content normal.         Judgment: Judgment normal.         Debilities/Disabilities Identified: None    Labs:     Lab Results (last 7 days)     Procedure Component Value Units Date/Time    Urinalysis, Microscopic Only - Urine, Clean Catch [[id number]]  (Abnormal) Collected: 01/04/23 1205    Specimen: Urine, Clean Catch Updated: 01/04/23 1246      RBC, UA 3-5 /HPF      WBC, UA None Seen /HPF      Bacteria, UA None Seen /HPF      Squamous Epithelial Cells, UA 0-2 /HPF      Hyaline Casts, UA None Seen /LPF      Methodology Manual Light Microscopy    Urine Drug Screen - Urine, Clean Catch [[id number]]  (Normal) Collected: 01/04/23 1205    Specimen: Urine, Clean Catch Updated: 01/04/23 1227     THC, Screen, Urine Negative     Phencyclidine (PCP), Urine Negative     Cocaine Screen, Urine Negative     Methamphetamine, Ur Negative     Opiate Screen Negative     Amphetamine Screen, Urine Negative     Benzodiazepine Screen, Urine Negative     Tricyclic Antidepressants Screen Negative     Methadone Screen, Urine Negative     Barbiturates Screen, Urine Negative     Oxycodone Screen, Urine Negative     Propoxyphene Screen Negative     Buprenorphine, Screen, Urine Negative    Narrative:      Urine drug screen results are to be used for medical purposes only.  They are not to be used for legal purposes such as employment testing.  Negative results do not necessarily mean the complete absence of a subtance, but rather that the result is less than the cutoff for that substance.  Positive results are unconfirmed and considered Preliminary Positive.  [facility] does not automatically confirm Postitive Unconfirmed results.  The physician may request (order) an Unconfirmed Positive result to be sent out for confirmation.      Negative Thresholds for Drugs Screened:    THC screen, urine                          50 ng/ml  Phenycyclidine (PCP), urine                25 ng/ml  Cocaine screen, urine                     150 ng/ml  Methamphetamine, urine                    500 ng/ml  Opiate screen, urine                      100 ng/ml  Amphetamine screen, urine                 500 ng/ml  Benzodiazepine screen, urine              150 ng/ml  Tricyclic Antidepressants screen, urine   300 ng/ml  Methadone screen, urine                   200 ng/ml  Barbiturates screen,  urine                200 ng/ml  Oxycodone screen, urine                   100 ng/ml  Propoxyphene screen, urine                300 ng/ml  Buprenorphine screen, urine                10 ng/ml    Urinalysis With Microscopic If Indicated (No Culture) - Urine, Clean Catch [[id number]]  (Abnormal) Collected: 23 120    Specimen: Urine, Clean Catch Updated: 23 1222     Color, UA Yellow     Appearance, UA Clear     pH, UA 7.0     Specific Gravity, UA 1.015     Glucose, UA Negative     Ketones, UA Negative     Bilirubin, UA Negative     Blood, UA Trace     Protein, UA Negative     Leuk Esterase, UA Negative     Nitrite, UA Negative     Urobilinogen, UA 0.2 E.U./dL    CBC (No Diff) [[id number]]  (Normal) Collected: 23 120    Specimen: Blood Updated: 23 1217     WBC 6.98 10*3/mm3      RBC 4.36 10*6/mm3      Hemoglobin 12.8 g/dL      Hematocrit 39.1 %      MCV 89.7 fL      MCH 29.4 pg      MCHC 32.7 g/dL      RDW 13.0 %      RDW-SD 43.0 fl      MPV 11.6 fL      Platelets 190 10*3/mm3           Assessment/Plan:   1. 26 yo  with IUP @ 40.2 weeks with BPP 0/8 and IUGR now with exam concerning for abruption., plan 1 LTCS. Risks, benefits and alternatives of the procedure were discussed, including , but not limited to: infection, bleeding, transfusion, injury to adjacent structures, laparotomy, possible nondiagnostic findings, possible findings of unexpected malignancy, reoperation, recurrent symptoms, thromboembolic events, anaeasthetic complications and death. Pre/intra/postop course was reviewed and all questions answered.           I discussed the patients findings and my recommendations with patient, family and nursing staff.     Rola Keen MD  23  17:44 EST

## 2023-01-04 NOTE — L&D DELIVERY NOTE
[facility]   Section Operative Note    Pre-Operative Dx:   1) 27 y.o.   2) IUP at 40.2  3) Indications for  section: vaginal bleeding and supected abruption  4) non reassuring fetal status  5) BPP 0/8  6) IUGR         Postoperative dx:     Same, plus:  7) 30 % abruption     Procedure:  1 [facility]   Surgeon:   Rola Keen MD     Assistant: Shree Ha CFA   Anesthesia:     EBL:   mls.  800 mls.         IV Fluids: 400 mls.   UOP: 500 mls.    No intake/output data recorded.   Antibiotics: cefazolin (Ancef) and Zithromax     Infant:      Time of Delivery : 18:00    Gender: male  infant    Weight: 5.10#    Apgars:   8@ 1 minute /       9@ 5 minutes      Meconium:   Nuchal Cord:    no  yes            Indication for C/Section: suspected abruption                                     Procedure Details:   Once all questions were answered, the patient was given IV antibiotics for ID prophylaxis. Pt was taken to the OR . A arriola catheter was already placed and hung to gravity for the duration of the procedure. SCD's were placed on the lower extremities bilaterally for DVT prophylaxis. Once the pt was prepped and draped and anesthesia was found to be adequate, a Pfannensteil skin incision was made on the abdomen and carried down to the fascia. The fascia was incised and extended with Christian scissors. Kocher clamps were placed on the superior and inferior aspect of the fascia and the rectus abdominal muscles were sharply and bluntly taken down. The rectus abdominal muscles were  in the midline and the periteoneum was entered and bluntly extended. A bladder blade was then inserted and a bladder flap was created. A low transverse incision was made on the uterus and bluntly extended. Artificial rupture of membranes was performed with clear fluid noted. The fetal head was delivered followed by the rest of the body. There was a nuchal cord that was reduced.  Cord was clamped and cut once it  had stopped pulsating and the baby was taken to the warmer. Cord blood was collected and the placenta was delivered. The placenta was sent to pathology for review.  There was noted to be a 30 % acute abruption. VV cord gases: 78.33, - 1.0 BE and AA cord gases 7.3 and - 0.7 BE,The uterus was exteriorized and cleared of all clots and debris. The uterine incision was repaired in 2 layers. The first layer was a running and locked fashion with 0-Vicryl and the second layer was an imbricating repair with 0-Vicryl suture. The uterus was firm and hemostatic. The abdomen was irrigated and aspirated with warm normal saline. The uterus was placed back into the abdomen. The fascia was reapproximated with 0-vicryl suture. The subcutaneous layer was irrigated and cauterized as needed for hemostasis. The skin was reapproximated with 4-0 vicryl. All counts were correct for the procedure.  Pt tolerated the procedure well. Mom and baby are stable and progressing well.   was responsible for performing the following activities: Retraction, Suction, Irrigation, Suturing, Closing, Placing Dressing and Delivery of Fetus and their skilled assistance was necessary for the success of this case.       Complications:   None      Disposition:   Mother to Mother Baby/Postpartum  in stable condition currently.   Baby to remains with mom  in stable condition currently.       Rola Keen MD  1/4/2023  18:46 EST

## 2023-01-04 NOTE — ANESTHESIA PREPROCEDURE EVALUATION
Anesthesia Evaluation     Patient summary reviewed and Nursing notes reviewed   no history of anesthetic complications:  NPO Solid Status: > 6 hours  NPO Liquid Status: > 2 hours           Airway   Mallampati: II  TM distance: >3 FB  Neck ROM: full  No difficulty expected  Dental - normal exam     Pulmonary - negative pulmonary ROS and normal exam    breath sounds clear to auscultation  Cardiovascular - normal exam  Exercise tolerance: good (4-7 METS)    Rhythm: regular  Rate: normal        Neuro/Psych- negative ROS  GI/Hepatic/Renal/Endo      Musculoskeletal (-) negative ROS    Abdominal   (+) obese,    Substance History - negative use     OB/GYN    (+) Pregnant,         Other - negative ROS                       Anesthesia Plan    ASA 1     epidural     intravenous induction     Anesthetic plan, risks, benefits, and alternatives have been provided, discussed and informed consent has been obtained with: patient.    Use of blood products discussed with patient  Consented to blood products.       CODE STATUS:

## 2023-01-04 NOTE — PROGRESS NOTES
OB follow up > 20 weeks     Chief Complaint   Patient presents with   • Routine Prenatal Visit       Beatris Butler is a 27 y.o.  40w2d being seen today for her obstetrical visit.  She reports good fetal movement. Patient reports occas contractions and vaginal spotting yesterday.       Review of Systems  Genitourinary: Negative for  SROM. + occas contractions and vaginal spotting   Fetal movement: normal  No Known Allergies     /72   Wt 69.7 kg (153 lb 9.6 oz)   LMP 2022   BMI 25.56 kg/m²     FHT: 130s  BPM   Uterine Size: 38 cm     SVE:  /-3.     Assessment    1) pregnancy at 40w2d- GBS negative.  US IMP: Growth 9%. HAMLET 5cm. VTX. Placenta anterior. HAMLET 5cm. BPP 0/8. 1 movement noted. Abnormal appearance of patient's chest.     2) H/O  delivery-  @ 34 weeks. No progesterone therapy.     3) S/P Flu vaccine    4) S/P covid vaccine and booster     5) S/P tDap vaccine     6) Greek speaking:  used for visit.     7) CF, SMA and FX neg. NIPS neg.     8)  ASCUS/HPV+     9) + Chlamydia- SANCHO neg 10/22 and neg      10) Abnormal US- Disc US finding with patient. She understands she needs IOL. [facility] Case disc w/ Dr. Mohr and Dr. Benitez. [facility] notified as well. Rec harshad for delivery d/t US findings today.     Plan    Continue prenatal vitamins  Reviewed this stage of pregnancy  Problem list updated   To  for NST and IOL    I spent 25 minutes caring for Beatris on this date of service. This time includes time spent by me in the following activities: preparing for the visit, reviewing tests, obtaining and/or reviewing a separately obtained history, performing a medically appropriate examination and/or evaluation, counseling and educating the patient/family/caregiver, ordering medications, tests, or procedures, documenting information in the medical record and care coordination      Ruth Grant, APRN  2023  10:39 EST

## 2023-01-05 LAB
BASOPHILS # BLD AUTO: 0.03 10*3/MM3 (ref 0–0.2)
BASOPHILS NFR BLD AUTO: 0.3 % (ref 0–1.5)
DEPRECATED RDW RBC AUTO: 44.2 FL (ref 37–54)
EOSINOPHIL # BLD AUTO: 0.07 10*3/MM3 (ref 0–0.4)
EOSINOPHIL NFR BLD AUTO: 0.6 % (ref 0.3–6.2)
ERYTHROCYTE [DISTWIDTH] IN BLOOD BY AUTOMATED COUNT: 13.2 % (ref 12.3–15.4)
HCT VFR BLD AUTO: 32.6 % (ref 34–46.6)
HGB BLD-MCNC: 10.8 G/DL (ref 12–15.9)
IMM GRANULOCYTES # BLD AUTO: 0.02 10*3/MM3 (ref 0–0.05)
IMM GRANULOCYTES NFR BLD AUTO: 0.2 % (ref 0–0.5)
LYMPHOCYTES # BLD AUTO: 1.71 10*3/MM3 (ref 0.7–3.1)
LYMPHOCYTES NFR BLD AUTO: 14.7 % (ref 19.6–45.3)
MCH RBC QN AUTO: 30.2 PG (ref 26.6–33)
MCHC RBC AUTO-ENTMCNC: 33.1 G/DL (ref 31.5–35.7)
MCV RBC AUTO: 91.1 FL (ref 79–97)
MONOCYTES # BLD AUTO: 0.99 10*3/MM3 (ref 0.1–0.9)
MONOCYTES NFR BLD AUTO: 8.5 % (ref 5–12)
NEUTROPHILS NFR BLD AUTO: 75.7 % (ref 42.7–76)
NEUTROPHILS NFR BLD AUTO: 8.85 10*3/MM3 (ref 1.7–7)
PLATELET # BLD AUTO: 147 10*3/MM3 (ref 140–450)
PMV BLD AUTO: 11.8 FL (ref 6–12)
RBC # BLD AUTO: 3.58 10*6/MM3 (ref 3.77–5.28)
WBC NRBC COR # BLD: 11.67 10*3/MM3 (ref 3.4–10.8)

## 2023-01-05 PROCEDURE — 85025 COMPLETE CBC W/AUTO DIFF WBC: CPT | Performed by: OBSTETRICS & GYNECOLOGY

## 2023-01-05 PROCEDURE — 25010000002 KETOROLAC TROMETHAMINE PER 15 MG: Performed by: OBSTETRICS & GYNECOLOGY

## 2023-01-05 PROCEDURE — 0503F POSTPARTUM CARE VISIT: CPT | Performed by: NURSE PRACTITIONER

## 2023-01-05 RX ADMIN — ACETAMINOPHEN 1000 MG: 500 TABLET, FILM COATED ORAL at 06:44

## 2023-01-05 RX ADMIN — DOCUSATE SODIUM 100 MG: 100 CAPSULE ORAL at 21:02

## 2023-01-05 RX ADMIN — KETOROLAC TROMETHAMINE 15 MG: 30 INJECTION, SOLUTION INTRAMUSCULAR; INTRAVENOUS at 01:58

## 2023-01-05 RX ADMIN — OXYCODONE HYDROCHLORIDE 5 MG: 5 TABLET ORAL at 09:25

## 2023-01-05 RX ADMIN — IBUPROFEN 600 MG: 600 TABLET ORAL at 23:37

## 2023-01-05 RX ADMIN — KETOROLAC TROMETHAMINE 15 MG: 30 INJECTION, SOLUTION INTRAMUSCULAR; INTRAVENOUS at 15:18

## 2023-01-05 RX ADMIN — KETOROLAC TROMETHAMINE 15 MG: 30 INJECTION, SOLUTION INTRAMUSCULAR; INTRAVENOUS at 07:51

## 2023-01-05 RX ADMIN — ACETAMINOPHEN 1000 MG: 500 TABLET, FILM COATED ORAL at 15:18

## 2023-01-05 RX ADMIN — PRENATAL VIT W/ FE FUMARATE-FA TAB 27-0.8 MG 1 TABLET: 27-0.8 TAB at 09:25

## 2023-01-05 RX ADMIN — ACETAMINOPHEN 1000 MG: 500 TABLET, FILM COATED ORAL at 21:02

## 2023-01-05 NOTE — NURSING NOTE
RN at beside with Dr. Benitez using ipad  to discuss bleeding and need and consent for C/S. Questions encouraged, patient verbalized understanding. Unable to gather  ID information due to RN positioning.

## 2023-01-05 NOTE — NURSING NOTE
RN at bedside with  on IPAD to discuss fetal decelerations, intervention performed and plan of care. Questions encouraged, pt. verbalized understanding.   : Jacek- [phone]

## 2023-01-05 NOTE — PLAN OF CARE
Problem: Adult Inpatient Plan of Care  Goal: Plan of Care Review  Outcome: Met  Flowsheets (Taken 2023 1343)  Plan of Care Reviewed With:   patient   spouse  Outcome Evaluation: VSS, up ad mayo, pain controlled well with scheduled and prn pain med, d/c home today  Goal: Patient-Specific Goal (Individualized)  Outcome: Met  Goal: Absence of Hospital-Acquired Illness or Injury  Outcome: Met  Intervention: Identify and Manage Fall Risk  Recent Flowsheet Documentation  Taken 2023 09 by Jamilah Boggs, RN  Safety Promotion/Fall Prevention: safety round/check completed  Goal: Optimal Comfort and Wellbeing  Outcome: Met  Intervention: Provide Person-Centered Care  Recent Flowsheet Documentation  Taken 2023 by Jamilah Boggs, RN  Trust Relationship/Rapport:   care explained   questions encouraged   questions answered   thoughts/feelings acknowledged  Goal: Readiness for Transition of Care  Outcome: Met     Problem: Adjustment to Role Transition (Postpartum  Delivery)  Goal: Successful Maternal Role Transition  Outcome: Met     Problem: Bleeding (Postpartum  Delivery)  Goal: Hemostasis  Outcome: Met     Problem: Infection (Postpartum  Delivery)  Goal: Absence of Infection Signs and Symptoms  Outcome: Met     Problem: Pain (Postpartum  Delivery)  Goal: Acceptable Pain Control  Outcome: Met     Problem: Postoperative Nausea and Vomiting (Postpartum  Delivery)  Goal: Nausea and Vomiting Relief  Outcome: Met     Problem: Postoperative Urinary Retention (Postpartum  Delivery)  Goal: Effective Urinary Elimination  Outcome: Met     Problem: Breastfeeding  Goal: Effective Breastfeeding  Outcome: Met   Goal Outcome Evaluation:  Plan of Care Reviewed With: patient, spouse           Outcome Evaluation: VSS, up ad mayo, pain controlled well with scheduled and prn pain med, d/c home today

## 2023-01-05 NOTE — NURSING NOTE
RN at bedside for assessment and introduction with  on IPAD. After assessment RN asked if patient has any questions. PT has no questions at this time.   - Josue [phone]

## 2023-01-05 NOTE — PROGRESS NOTES
[facility]   PROGRESS NOTE    Post-Op Day 1 S/P   Subjective   Subjective  Patient reports:  Pain is well controlled with IV toradol.  She is up and ambulating. Tolerating diet. Tolerating po -- normal for liquids and normal for solids.  Intake -- c/o of tolerating po solids and tolerating po liquids.   Voiding - without difficulty; flatus reported..  Vaginal bleeding is as much as expected.    Objective    Objective     Vitals: Vital Signs Range for the last 24 hours  Temperature: Temp:  [97.4 °F (36.3 °C)-98.5 °F (36.9 °C)] 98.4 °F (36.9 °C)   Temp Source: Temp src: Oral   BP: BP: (102-135)/(56-89) 116/69   Pulse: Heart Rate:  [56-86] 71   Respirations: Resp:  [16-20] 17   SPO2: SpO2:  [98 %-100 %] 99 %   O2 Amount (l/min):     O2 Devices Device (Oxygen Therapy): room air   Weight: Weight:  [69.7 kg (153 lb 9.6 oz)-69.9 kg (154 lb)] 69.9 kg (154 lb)            Physical Exam    Lungs clear to auscultation bilaterally   Abdomen Soft, fundus, bowel sounds present   Incision  Dressing C/D/I    Extremities edema trace and Homans sign is negative, no sign of DVT     I reviewed the patient's new clinical results.    Assessment & Plan        Pregnancy    S/P  section    Assessment & Plan    Assessment:    Beatris Luke is Day 1  post-partum  , Low Transverse   .      Plan:  1) Postop day #1- S/P PLTCS. Rh+. Hgb 10.8g/dL.      2) Postpartum- Advance.     3) Postpartum anemia- Continue ferrous gluconate.     4) Male infant- Breast and bottle. Desires circ.     5) Dispo- Plan home tomorrow or Saturday       Ruth Grant, APRN  23  08:38 EST

## 2023-01-05 NOTE — PLAN OF CARE
Goal Outcome Evaluation:  Plan of Care Reviewed With: patient, spouse        Progress: improving  Outcome Evaluation: Patient currently in recovery after a primary C/S. Patient currently rates pain 0/10, reports minimal feeling in legs. VS are stable. Barrios in place with adeqaute clear output. Pt currently breastfeeding and bonding well with baby.

## 2023-01-05 NOTE — PLAN OF CARE
Goal Outcome Evaluation:  Plan of Care Reviewed With: patient        Progress: improving  Outcome Evaluation: vital signs stable, due to void and ambulate, pain well controlled with po meds, fundal checks wnl

## 2023-01-05 NOTE — PLAN OF CARE
Goal Outcome Evaluation:    Problem: Adult Inpatient Plan of Care  Goal: Plan of Care Review  1/5/2023 1751 by Jamilah Boggs RN  Outcome: Ongoing, Progressing  Flowsheets (Taken 1/5/2023 1751)  Progress: improving  Plan of Care Reviewed With:   patient   spouse  Outcome Evaluation: VSS, up ad mayo, fundus firm, bleeding scant to light, showered today. pain well controlled with prn and scheduled pain medication and abd binder. bonding well with infant.  1/5/2023 1748 by Jamilah Boggs RN  Outcome: Ongoing, Progressing  Flowsheets (Taken 1/5/2023 1748)  Plan of Care Reviewed With:   patient   spouse  1/5/2023 1358 by Jamilah Boggs RN  Reactivated  1/5/2023 1343 by Jamilah Boggs RN  Outcome: Met  Flowsheets (Taken 1/5/2023 1343)  Plan of Care Reviewed With:   patient   spouse  Outcome Evaluation: VSS, up ad mayo, pain controlled well with scheduled and prn pain med, d/c home today  Goal: Patient-Specific Goal (Individualized)  1/5/2023 1751 by Jamilah Boggs RN  Outcome: Ongoing, Progressing  1/5/2023 1748 by Jamilah Boggs RN  Outcome: Ongoing, Progressing  1/5/2023 1358 by Jamilah Boggs RN  Reactivated  1/5/2023 1343 by Jamilah Boggs RN  Outcome: Met  Goal: Absence of Hospital-Acquired Illness or Injury  1/5/2023 1751 by Jamilah Boggs RN  Outcome: Ongoing, Progressing  1/5/2023 1748 by Jamilah Boggs RN  Outcome: Ongoing, Progressing  1/5/2023 1358 by Jamilah Boggs RN  Reactivated  1/5/2023 1343 by Jamilah Boggs RN  Outcome: Met  Intervention: Identify and Manage Fall Risk  Recent Flowsheet Documentation  Taken 1/5/2023 1627 by Jamilah Boggs RN  Safety Promotion/Fall Prevention: safety round/check completed  Taken 1/5/2023 0925 by Jamilah Boggs RN  Safety Promotion/Fall Prevention: safety round/check completed  Goal: Optimal Comfort and Wellbeing  1/5/2023 1751 by Flakita, Jamilah, RN  Outcome: Ongoing, Progressing  1/5/2023 1748 by Jamilah Boggs, RN  Outcome: Ongoing, Progressing  1/5/2023 1358  by Jamilah Boggs RN  Reactivated  2023 1343 by Jamilah Boggs RN  Outcome: Met  Intervention: Provide Person-Centered Care  Recent Flowsheet Documentation  Taken 2023 1627 by Jamilah Boggs RN  Trust Relationship/Rapport:   care explained   questions answered   questions encouraged   thoughts/feelings acknowledged  Taken 2023 0925 by Jamilah Boggs RN  Trust Relationship/Rapport:   care explained   questions encouraged   questions answered   thoughts/feelings acknowledged  Goal: Readiness for Transition of Care  2023 1751 by Jamilah Boggs RN  Outcome: Ongoing, Progressing  2023 1748 by Jamilah Boggs RN  Outcome: Ongoing, Progressing  2023 1358 by Jamilah Boggs RN  Reactivated  2023 1343 by Jamilah Boggs RN  Outcome: Met     Problem: Adjustment to Role Transition (Postpartum  Delivery)  Goal: Successful Maternal Role Transition  2023 1751 by Jamilah Boggs RN  Outcome: Ongoing, Progressing  2023 1748 by Jamilah Boggs RN  Outcome: Ongoing, Progressing  2023 1358 by Jamilah Boggs RN  Reactivated  2023 1343 by Jamilah Boggs RN  Outcome: Met     Problem: Bleeding (Postpartum  Delivery)  Goal: Hemostasis  2023 1751 by Jamilah Boggs RN  Outcome: Ongoing, Progressing  2023 1748 by Jamilah Boggs RN  Outcome: Ongoing, Progressing  2023 1358 by Jamilah Boggs RN  Reactivated  2023 1343 by Jamilah Boggs RN  Outcome: Met     Problem: Pain (Postpartum  Delivery)  Goal: Acceptable Pain Control  2023 1751 by Jamilah Boggs RN  Outcome: Ongoing, Progressing  2023 1748 by Jamilah Boggs RN  Outcome: Ongoing, Progressing  2023 1358 by Jamilah Boggs RN  Reactivated  2023 1343 by Jamilah Boggs RN  Outcome: Met     Problem: Postoperative Nausea and Vomiting (Postpartum  Delivery)  Goal: Nausea and Vomiting Relief  2023 1751 by Jamilah Boggs, RN  Outcome: Ongoing, Progressing  2023 1748 by  Jamilah Boggs RN  Outcome: Ongoing, Progressing  2023 1358 by Jamilah Boggs RN  Reactivated  2023 1343 by Jamilah Boggs RN  Outcome: Met     Problem: Postoperative Urinary Retention (Postpartum  Delivery)  Goal: Effective Urinary Elimination  2023 1751 by Jamilah Boggs RN  Outcome: Ongoing, Progressing  2023 1748 by Jamilah Boggs RN  Outcome: Ongoing, Progressing  2023 1358 by Jamilah Boggs RN  Reactivated  2023 1343 by Jamilah Boggs RN  Outcome: Met     Problem: Breastfeeding  Goal: Effective Breastfeeding  2023 1751 by Jamilah Boggs RN  Outcome: Ongoing, Progressing  2023 1748 by Jamilah Boggs RN  Outcome: Ongoing, Progressing  2023 1358 by Jamilah Boggs RN  Reactivated  2023 1343 by Jamilah Boggs RN  Outcome: Met

## 2023-01-05 NOTE — PROGRESS NOTES
Patient: Beatris BarrerakatieJaya  Procedure(s):   SECTION PRIMARY  Anesthesia type: epidural    Patient location: Labor and Delivery  Last vitals:   Vitals:    23 0913   BP: 116/58   Pulse: 65   Resp: 16   Temp: 97.7 °F (36.5 °C)   SpO2: 98%     Level of consciousness: awake, alert and oriented    Post-anesthesia pain: adequate analgesia  Airway patency: patent  Respiratory: unassisted  Cardiovascular: stable and blood pressure at baseline  Hydration: euvolemic    Anesthetic complications: no

## 2023-01-06 VITALS
HEART RATE: 75 BPM | TEMPERATURE: 97.7 F | HEIGHT: 61 IN | WEIGHT: 154 LBS | RESPIRATION RATE: 16 BRPM | DIASTOLIC BLOOD PRESSURE: 61 MMHG | SYSTOLIC BLOOD PRESSURE: 122 MMHG | OXYGEN SATURATION: 98 % | BODY MASS INDEX: 29.07 KG/M2

## 2023-01-06 PROCEDURE — 0503F POSTPARTUM CARE VISIT: CPT | Performed by: STUDENT IN AN ORGANIZED HEALTH CARE EDUCATION/TRAINING PROGRAM

## 2023-01-06 RX ORDER — IBUPROFEN 600 MG/1
600 TABLET ORAL EVERY 6 HOURS
Qty: 30 TABLET | Refills: 0 | Status: SHIPPED | OUTPATIENT
Start: 2023-01-06

## 2023-01-06 RX ORDER — OXYCODONE HYDROCHLORIDE AND ACETAMINOPHEN 5; 325 MG/1; MG/1
1 TABLET ORAL EVERY 4 HOURS PRN
Qty: 20 TABLET | Refills: 0 | Status: SHIPPED | OUTPATIENT
Start: 2023-01-06

## 2023-01-06 RX ORDER — DOXYCYCLINE HYCLATE 50 MG/1
324 CAPSULE, GELATIN COATED ORAL 2 TIMES DAILY WITH MEALS
Status: DISCONTINUED | OUTPATIENT
Start: 2023-01-06 | End: 2023-01-06 | Stop reason: HOSPADM

## 2023-01-06 RX ADMIN — Medication 324 MG: at 12:37

## 2023-01-06 RX ADMIN — ACETAMINOPHEN 650 MG: 325 TABLET ORAL at 16:47

## 2023-01-06 RX ADMIN — IBUPROFEN 600 MG: 600 TABLET ORAL at 06:02

## 2023-01-06 RX ADMIN — DOCUSATE SODIUM 100 MG: 100 CAPSULE ORAL at 08:37

## 2023-01-06 RX ADMIN — IBUPROFEN 600 MG: 600 TABLET ORAL at 12:37

## 2023-01-06 RX ADMIN — ACETAMINOPHEN 650 MG: 325 TABLET ORAL at 03:15

## 2023-01-06 RX ADMIN — PRENATAL VIT W/ FE FUMARATE-FA TAB 27-0.8 MG 1 TABLET: 27-0.8 TAB at 08:37

## 2023-01-06 RX ADMIN — ACETAMINOPHEN 650 MG: 325 TABLET ORAL at 09:24

## 2023-01-06 NOTE — PLAN OF CARE
Goal Outcome Evaluation:  Plan of Care Reviewed With: patient, significant other           Outcome Evaluation: VSS. Fundus firm U/1 w/ scant rubra. Pt states pain is well controlled with motrion and tylenol. Up and mayo and voiding. Breast and bottle feeding. Bonding well with infant.      Problem: Adult Inpatient Plan of Care  Goal: Plan of Care Review  Outcome: Ongoing, Progressing  Flowsheets  Taken 1/6/2023 0327 by Judith Cotter RN  Plan of Care Reviewed With:   patient   significant other  Outcome Evaluation: VSS. Fundus firm U/1 w/ scant rubra. Pt states pain is well controlled with motrion and tylenol. Up and mayo and voiding. Breast and bottle feeding. Bonding well with infant.  Taken 1/5/2023 1751 by Jamilah Boggs RN  Progress: improving  Goal: Patient-Specific Goal (Individualized)  Outcome: Ongoing, Progressing  Flowsheets (Taken 1/5/2023 0649 by Goldie Cotto, RN)  Individualized Care Needs: breast feeding  Anxieties, Fears or Concerns: pain control  Goal: Absence of Hospital-Acquired Illness or Injury  Outcome: Ongoing, Progressing  Intervention: Identify and Manage Fall Risk  Recent Flowsheet Documentation  Taken 1/6/2023 0300 by Judith Cotter RN  Safety Promotion/Fall Prevention: safety round/check completed  Taken 1/6/2023 0030 by Judith Cotter RN  Safety Promotion/Fall Prevention: safety round/check completed  Taken 1/5/2023 2200 by Judith Cotter RN  Safety Promotion/Fall Prevention: safety round/check completed  Taken 1/5/2023 2030 by Judith Cotter RN  Safety Promotion/Fall Prevention: safety round/check completed  Intervention: Prevent and Manage VTE (Venous Thromboembolism) Risk  Recent Flowsheet Documentation  Taken 1/6/2023 0300 by Judith Cotter RN  Activity Management: up ad mayo  Intervention: Prevent Infection  Recent Flowsheet Documentation  Taken 1/6/2023 0300 by Judith Cotter RN  Infection Prevention:   cohorting utilized   environmental surveillance performed   hand hygiene  promoted   rest/sleep promoted  Goal: Optimal Comfort and Wellbeing  Outcome: Ongoing, Progressing  Intervention: Monitor Pain and Promote Comfort  Recent Flowsheet Documentation  Taken 2023 0300 by Judith Cotter RN  Pain Management Interventions: see MAR  Taken 2023 by Judith Cotter RN  Pain Management Interventions: see MAR  Intervention: Provide Person-Centered Care  Recent Flowsheet Documentation  Taken 2023 030 by Judith Cotter RN  Trust Relationship/Rapport:   care explained   choices provided  Taken 2023 2030 by Judith Cotter RN  Trust Relationship/Rapport:   care explained   choices provided  Goal: Readiness for Transition of Care  Outcome: Ongoing, Progressing     Problem: Adjustment to Role Transition (Postpartum  Delivery)  Goal: Successful Maternal Role Transition  Outcome: Ongoing, Progressing     Problem: Bleeding (Postpartum  Delivery)  Goal: Hemostasis  Outcome: Ongoing, Progressing     Problem: Pain (Postpartum  Delivery)  Goal: Acceptable Pain Control  Outcome: Ongoing, Progressing  Intervention: Prevent or Manage Pain  Recent Flowsheet Documentation  Taken 2023 0300 by Judith Cotter RN  Pain Management Interventions: see MAR  Taken 2023 by Judith Cotter RN  Pain Management Interventions: see MAR     Problem: Postoperative Nausea and Vomiting (Postpartum  Delivery)  Goal: Nausea and Vomiting Relief  Outcome: Ongoing, Progressing     Problem: Postoperative Urinary Retention (Postpartum  Delivery)  Goal: Effective Urinary Elimination  Outcome: Ongoing, Progressing     Problem: Breastfeeding  Goal: Effective Breastfeeding  Outcome: Ongoing, Progressing

## 2023-01-06 NOTE — DISCHARGE SUMMARY
"Obstetrical Discharge Form    Primary OB Clinician: [facility]      Preadmission Diagnosis:  1. Beatris Butler is a 27 y.o.  with IUP @ 40w2d BPP 0/8. Immediate admission to L&D     Discharge Diagnosis:  Same, plus:     Antepartum complications: intrauterine growth restriction    Date of Delivery:  2023     Delivered By: Rola Keen     Delivery Type: primary  section, low transverse incision    Tubal Ligation: n/a    Baby: Liveborn male    Anesthesia: epidural    Intrapartum complications: None    Laceration: n/a    Feeding method: breast    Hospital Course: Beatris Butler is a 27 y.o.   who presented with an IUP 40w2d . Uncomplicated LTCS and PP progress.     Discharge Date: 2023; Discharge Time: 07:29 EST    Review of Systems  /68 (BP Location: Right arm, Patient Position: Sitting)   Pulse 70   Temp 98.4 °F (36.9 °C) (Oral)   Resp 18   Ht 154 cm (60.63\")   Wt 69.9 kg (154 lb)   LMP 2022   SpO2 99%   Breastfeeding Unknown   BMI 29.45 kg/m²      Physical Exam    VSS WNL since delivery.  Exam unremarkable with firm fundus.  Appropriate Hct drop .  No S/S PPD.    Results from last 7 days   Lab Units 23  0552   HEMOGLOBIN g/dL 10.8*     Infant: male  Feeding:breast  Rh status: positive, Rhogam was not indicated  Rubella: Immune  Diabetes: no  Contraception: none        Plan:  D/c today if infant cleared by peds.   Patient given written instruction sheet.  Follow-up appointment with Dr. Keen in 2 weeks.    Discharge time was less than 30 minutes.     Tee Blum DO  07:29 EST  2023  "

## 2023-01-06 NOTE — PLAN OF CARE
Problem: Adult Inpatient Plan of Care  Goal: Plan of Care Review  Outcome: Met  Goal: Patient-Specific Goal (Individualized)  Outcome: Met  Goal: Absence of Hospital-Acquired Illness or Injury  Outcome: Met  Goal: Optimal Comfort and Wellbeing  Outcome: Met  Intervention: Monitor Pain and Promote Comfort  Recent Flowsheet Documentation  Taken 1/6/2023 0902 by Michelle Quiñones, RN  Pain Management Interventions: no interventions per patient request  Intervention: Provide Person-Centered Care  Recent Flowsheet Documentation  Taken 1/6/2023 0902 by Michelle Quiñones, RN  Trust Relationship/Rapport:   care explained   choices provided  Goal: Readiness for Transition of Care  Outcome: Met   Goal Outcome Evaluation:

## 2023-01-07 NOTE — CASE MANAGEMENT/SOCIAL WORK
Case Management Discharge Note      Final Note: dc home         Selected Continued Care - Discharged on 1/6/2023 Admission date: 1/4/2023 - Discharge disposition: Home or Self Care    Destination    No services have been selected for the patient.              Durable Medical Equipment    No services have been selected for the patient.              Dialysis/Infusion    No services have been selected for the patient.              Home Medical Care    No services have been selected for the patient.              Therapy    No services have been selected for the patient.              Community Resources    No services have been selected for the patient.              Community & DME    No services have been selected for the patient.                       Final Discharge Disposition Code: 01 - home or self-care

## 2023-01-09 LAB
LAB AP CASE REPORT: NORMAL
PATH REPORT.FINAL DX SPEC: NORMAL

## 2023-01-23 ENCOUNTER — POSTPARTUM VISIT (OUTPATIENT)
Dept: OBSTETRICS AND GYNECOLOGY | Facility: CLINIC | Age: 28
End: 2023-01-23
Payer: MEDICAID

## 2023-01-23 ENCOUNTER — TELEPHONE (OUTPATIENT)
Dept: OBSTETRICS AND GYNECOLOGY | Facility: CLINIC | Age: 28
End: 2023-01-23

## 2023-01-23 VITALS
WEIGHT: 139.2 LBS | SYSTOLIC BLOOD PRESSURE: 118 MMHG | HEIGHT: 61 IN | BODY MASS INDEX: 26.28 KG/M2 | DIASTOLIC BLOOD PRESSURE: 70 MMHG

## 2023-01-23 LAB
BILIRUB BLD-MCNC: NEGATIVE MG/DL
CLARITY, POC: CLEAR
COLOR UR: YELLOW
GLUCOSE UR STRIP-MCNC: NEGATIVE MG/DL
KETONES UR QL: NEGATIVE
LEUKOCYTE EST, POC: NEGATIVE
NITRITE UR-MCNC: NEGATIVE MG/ML
PH UR: 5 [PH] (ref 5–8)
PROT UR STRIP-MCNC: NEGATIVE MG/DL
RBC # UR STRIP: ABNORMAL /UL
SP GR UR: 1 (ref 1–1.03)
UROBILINOGEN UR QL: NORMAL

## 2023-01-23 PROCEDURE — 0503F POSTPARTUM CARE VISIT: CPT | Performed by: NURSE PRACTITIONER

## 2023-01-23 NOTE — PROGRESS NOTES
"Chief Complaint   Patient presents with   • Postpartum Care     2 week incision check         HPI      Date of delivery: 23. S/P Primary  (LTCS) D/t suspected aburption and non reassuring fetal status, BPP 0/8.     Baby: Male     The patient feels well. Patient describes her bleeding as thin lochia.     Breastfeeding: infant latching without difficulty without pain.   Adequate supply: yes  Problems with breast feeding: no    Bowel and Bladder normal function: yes   Taking prenatal vitamins:  No   Adequate sleep: yes  Sexual activity: no     Post-partum depression:  no    Review of Systems   Constitutional: Positive for fatigue.   Genitourinary: Positive for vaginal bleeding.   Psychiatric/Behavioral: Positive for sleep disturbance.       The following portions of the patient's history were reviewed and updated as appropriate: allergies, current medications and problem list.             /70   Ht 154 cm (60.63\")   Wt 63.1 kg (139 lb 3.2 oz)   Breastfeeding Yes   BMI 26.62 kg/m²       Physical Exam  Constitutional:       Appearance: Normal appearance.   Abdominal:      Palpations: Abdomen is soft.       Musculoskeletal:         General: Normal range of motion.   Skin:     General: Skin is warm and dry.   Neurological:      General: No focal deficit present.      Mental Status: She is alert and oriented to person, place, and time.   Psychiatric:         Mood and Affect: Mood normal.         Behavior: Behavior normal.                       1. 2 weeks postop: Progressing well.   2. Postpartum care: Continue  3. Pap: ASCUS/HPV+, needs repeat pap at next appt   4. Contraception: Desires Nexplanon, check benefits   5. RTO 4 weeks       Ruth Grant, APRN  2023  11:22 EST        "

## 2023-02-22 ENCOUNTER — POSTPARTUM VISIT (OUTPATIENT)
Dept: OBSTETRICS AND GYNECOLOGY | Facility: CLINIC | Age: 28
End: 2023-02-22

## 2023-02-22 VITALS
SYSTOLIC BLOOD PRESSURE: 110 MMHG | DIASTOLIC BLOOD PRESSURE: 70 MMHG | BODY MASS INDEX: 26.66 KG/M2 | HEIGHT: 61 IN | WEIGHT: 141.2 LBS

## 2023-02-22 DIAGNOSIS — Z11.51 SPECIAL SCREENING EXAMINATION FOR HUMAN PAPILLOMAVIRUS (HPV): ICD-10-CM

## 2023-02-22 DIAGNOSIS — Z30.017 NEXPLANON INSERTION: ICD-10-CM

## 2023-02-22 DIAGNOSIS — Z01.419 PAP SMEAR, LOW-RISK: ICD-10-CM

## 2023-02-22 DIAGNOSIS — Z13.89 SCREENING FOR GENITOURINARY CONDITION: ICD-10-CM

## 2023-02-22 LAB
B-HCG UR QL: NEGATIVE
BILIRUB BLD-MCNC: NEGATIVE MG/DL
CLARITY, POC: CLEAR
COLOR UR: YELLOW
EXPIRATION DATE: NORMAL
GLUCOSE UR STRIP-MCNC: NEGATIVE MG/DL
INTERNAL NEGATIVE CONTROL: NORMAL
INTERNAL POSITIVE CONTROL: NORMAL
KETONES UR QL: NEGATIVE
LEUKOCYTE EST, POC: NEGATIVE
Lab: NORMAL
NITRITE UR-MCNC: NEGATIVE MG/ML
PH UR: 5 [PH] (ref 5–8)
PROT UR STRIP-MCNC: NEGATIVE MG/DL
RBC # UR STRIP: NEGATIVE /UL
SP GR UR: 1 (ref 1–1.03)
UROBILINOGEN UR QL: NORMAL

## 2023-02-22 PROCEDURE — 81025 URINE PREGNANCY TEST: CPT | Performed by: NURSE PRACTITIONER

## 2023-02-22 PROCEDURE — 0503F POSTPARTUM CARE VISIT: CPT | Performed by: NURSE PRACTITIONER

## 2023-02-22 PROCEDURE — 11981 INSERTION DRUG DLVR IMPLANT: CPT | Performed by: NURSE PRACTITIONER

## 2023-02-22 NOTE — PROGRESS NOTES
"Chief Complaint   Patient presents with   • Procedure     Nexplanon insertion   • Postpartum Care        HPI      Date of delivery: 23. S/P Primary  (LTCS) D/t suspected aburption and non reassuring fetal status, BPP 0/8.     Baby: male    The patient feels well. Patient describes her bleeding as thin lochia.     Breastfeeding: infant latching without difficulty without pain.   Adequate supply: yes  Problems with breast feeding: no    Bowel and Bladder normal function: yes   Taking prenatal vitamins:  yes  Adequate sleep: yesyes  Sexual activity: no     Post-partum depression:  no    Review of Systems   Gastrointestinal: Negative.    Genitourinary: Negative for breast pain and menstrual problem.       The following portions of the patient's history were reviewed and updated as appropriate: allergies, current medications and problem list.             /70   Ht 154 cm (60.63\")   Wt 64 kg (141 lb 3.2 oz)   Breastfeeding Yes   BMI 27.01 kg/m²       Physical Exam  Vitals reviewed.   Constitutional:       General: She is awake. She is not in acute distress.     Appearance: She is not ill-appearing.   Eyes:      Conjunctiva/sclera: Conjunctivae normal.   Pulmonary:      Effort: Pulmonary effort is normal. No respiratory distress.   Abdominal:      Palpations: Abdomen is soft.      Comments: Low-transverse incision healing well   Genitourinary:     General: Normal vulva.      Exam position: Supine.      Pubic Area: No rash.       Labia:         Right: No rash or lesion.         Left: No rash or lesion.       Vagina: Normal.      Cervix: Normal.      Uterus: Normal.    Musculoskeletal:      Cervical back: Neck supple. No rigidity.   Skin:     General: Skin is warm and dry.      Capillary Refill: Capillary refill takes less than 2 seconds.   Neurological:      Mental Status: She is alert and oriented to person, place, and time.   Psychiatric:         Mood and Affect: Mood and affect normal.         " Behavior: Behavior normal.           Nexplanon Insertion:  Chief Complaint   Patient presents with   • Procedure     Nexplanon insertion   • Postpartum Care       Pre Dx: 1) Nexplanon Insertion   Post Dx: 1) Nexplanon Insertion     Procedures    Risks, benefits, alternatives explained. All questions answered. Consents signed.  Patient placed on examined table. Nexplanon to be inserted into nondominant arm. The area for insertion prepped with betadine in a sterile fashion. About 3 mL of 1% lidocaine was infiltrated without incident.    The insertion site was identified approximately 6-8 cm proximal to the elbow crease in the underside of the upper left arm overlying the groove between the biceps and triceps muscles. The skin at the insertion site was stretched by my thumb and index finger. Then inserted the needle tip, bevel side up, at an angle not greater than 20° to the skin surface, just until the skin was penetrated. The applicator was then lowered so that it was parallel to the arm. To minimize the chance of deep incision, the skin was tented upwards with the tip of the needle. The needle was then gently inserted to the full length. Then fixed the device in place on the arm with one hand. I then slowly and carefully retracted the needle cannula back along the length of the device after pushing the release button. The obturator was seen in the device to determine that the nexplanon had been released.     Both the patient and I were easily able to feel the device under the skin. Steri-Strips were applied at the site, and the arm was gently wrapped with gauze.    There were no complications.   The patient tolerated the procedure well.     She was given a reminder card. She was advised to use condoms as a backup method for at least a week after insertion.    Expectations, warning signs and limitations reviewed.     Diagnoses and all orders for this visit:    1. Postpartum follow-up (Primary)    2. Screening for  genitourinary condition  -     POC Pregnancy, Urine  -     POC Urinalysis Dipstick    3. Pap smear, low-risk  -     IGP,CtNgTv,rfx Aptima HPV ASCU    4. Special screening examination for human papillomavirus (HPV)  -     IGP,CtNgTv,rfx Aptima HPV ASCU    5. Nexplanon insertion  -     Etonogestrel (NEXPLANON) 68 MG subdermal implant      1. 6 weeks post-partum: Progressing well.   2. Hgb 10.8 g/dl; continue PNV  3. Postpartum care: healing well; no concerns  4. Postpartum depression: no concerns today  5. Pap: repeat today for ASCUS/HPV+  6. STI screening - desires today  7. Contraception: Nexplanon (placed today)       RTO Return as needed.      aMgo Magallanes, APRN    2/23/2023  09:48 EST            8.     Mago Magallanes, APRN  2/23/2023  09:48 EST

## 2023-03-02 LAB
C TRACH RRNA CVX QL NAA+PROBE: NEGATIVE
CONV .: ABNORMAL
CYTOLOGIST CVX/VAG CYTO: ABNORMAL
CYTOLOGY CVX/VAG DOC CYTO: ABNORMAL
CYTOLOGY CVX/VAG DOC THIN PREP: ABNORMAL
DX ICD CODE: ABNORMAL
DX ICD CODE: ABNORMAL
HIV 1 & 2 AB SER-IMP: ABNORMAL
N GONORRHOEA RRNA CVX QL NAA+PROBE: NEGATIVE
OTHER STN SPEC: ABNORMAL
PATHOLOGIST CVX/VAG CYTO: ABNORMAL
RECOM F/U CVX/VAG CYTO: ABNORMAL
STAT OF ADQ CVX/VAG CYTO-IMP: ABNORMAL
T VAGINALIS RRNA SPEC QL NAA+PROBE: NEGATIVE
